# Patient Record
Sex: FEMALE | Race: WHITE | NOT HISPANIC OR LATINO | Employment: FULL TIME | URBAN - METROPOLITAN AREA
[De-identification: names, ages, dates, MRNs, and addresses within clinical notes are randomized per-mention and may not be internally consistent; named-entity substitution may affect disease eponyms.]

---

## 2020-05-07 ENCOUNTER — HOSPITAL ENCOUNTER (OUTPATIENT)
Dept: RADIOLOGY | Facility: HOSPITAL | Age: 40
Discharge: HOME | End: 2020-05-07
Attending: ORTHOPAEDIC SURGERY
Payer: MEDICAID

## 2020-05-07 DIAGNOSIS — M84.375A STRESS FRACTURE OF LEFT FOOT, INITIAL ENCOUNTER: ICD-10-CM

## 2020-05-07 PROCEDURE — 73630 XR FOOT COMPLETE 3 VIEW LEFT: ICD-10-PCS | Mod: 26,LT,, | Performed by: RADIOLOGY

## 2020-05-07 PROCEDURE — 73630 X-RAY EXAM OF FOOT: CPT | Mod: TC,FY,LT

## 2020-05-07 PROCEDURE — 73630 X-RAY EXAM OF FOOT: CPT | Mod: 26,LT,, | Performed by: RADIOLOGY

## 2020-06-17 ENCOUNTER — HOSPITAL ENCOUNTER (OUTPATIENT)
Dept: RADIOLOGY | Facility: HOSPITAL | Age: 40
Discharge: HOME | End: 2020-06-17
Attending: ORTHOPAEDIC SURGERY
Payer: MEDICAID

## 2020-06-17 DIAGNOSIS — M84.375A STRESS REACTION OF LEFT FOOT, INITIAL ENCOUNTER: ICD-10-CM

## 2020-06-17 PROCEDURE — 73630 XR FOOT COMPLETE 3 VIEW LEFT: ICD-10-PCS | Mod: 26,LT,, | Performed by: RADIOLOGY

## 2020-06-17 PROCEDURE — 73630 X-RAY EXAM OF FOOT: CPT | Mod: 26,LT,, | Performed by: RADIOLOGY

## 2020-06-17 PROCEDURE — 73630 X-RAY EXAM OF FOOT: CPT | Mod: TC,FY,LT

## 2020-09-02 ENCOUNTER — HOSPITAL ENCOUNTER (OUTPATIENT)
Dept: RADIOLOGY | Facility: HOSPITAL | Age: 40
Discharge: HOME | End: 2020-09-02
Attending: ORTHOPAEDIC SURGERY
Payer: MEDICAID

## 2020-09-02 DIAGNOSIS — M79.672 CHRONIC PAIN IN LEFT FOOT: ICD-10-CM

## 2020-09-02 DIAGNOSIS — S92.352A CLOSED DISPLACED FRACTURE OF FIFTH METATARSAL BONE OF LEFT FOOT: ICD-10-CM

## 2020-09-02 DIAGNOSIS — M79.672 CHRONIC PAIN IN LEFT FOOT: Primary | ICD-10-CM

## 2020-09-02 DIAGNOSIS — G89.29 CHRONIC PAIN IN LEFT FOOT: Primary | ICD-10-CM

## 2020-09-02 DIAGNOSIS — G89.29 CHRONIC PAIN IN LEFT FOOT: ICD-10-CM

## 2020-09-02 PROCEDURE — 73630 X-RAY EXAM OF FOOT: CPT | Mod: TC,FY,LT

## 2020-09-02 PROCEDURE — 73630 X-RAY EXAM OF FOOT: CPT | Mod: 26,LT,, | Performed by: RADIOLOGY

## 2020-09-02 PROCEDURE — 73630 XR FOOT COMPLETE 3 VIEW LEFT: ICD-10-PCS | Mod: 26,LT,, | Performed by: RADIOLOGY

## 2021-01-13 ENCOUNTER — TELEPHONE (OUTPATIENT)
Dept: ORTHOPEDICS | Facility: CLINIC | Age: 41
End: 2021-01-13

## 2021-10-19 ENCOUNTER — HOSPITAL ENCOUNTER (EMERGENCY)
Facility: HOSPITAL | Age: 41
Discharge: HOME | End: 2021-10-19
Attending: EMERGENCY MEDICINE
Payer: MEDICAID

## 2021-10-19 VITALS
HEART RATE: 96 BPM | TEMPERATURE: 98 F | BODY MASS INDEX: 43.4 KG/M2 | DIASTOLIC BLOOD PRESSURE: 85 MMHG | SYSTOLIC BLOOD PRESSURE: 127 MMHG | RESPIRATION RATE: 16 BRPM | WEIGHT: 293 LBS | OXYGEN SATURATION: 100 % | HEIGHT: 69 IN

## 2021-10-19 DIAGNOSIS — S90.32XA CONTUSION OF LEFT FOOT, INITIAL ENCOUNTER: Primary | ICD-10-CM

## 2021-10-19 DIAGNOSIS — S92.355G CLOSED NONDISPLACED FRACTURE OF FIFTH METATARSAL BONE OF LEFT FOOT WITH DELAYED HEALING, SUBSEQUENT ENCOUNTER: ICD-10-CM

## 2021-10-19 DIAGNOSIS — R52 PAIN: ICD-10-CM

## 2021-10-19 PROCEDURE — 99283 EMERGENCY DEPT VISIT LOW MDM: CPT | Mod: 25

## 2021-12-01 ENCOUNTER — OFFICE VISIT (OUTPATIENT)
Dept: ORTHOPEDICS | Facility: CLINIC | Age: 41
End: 2021-12-01
Payer: MEDICAID

## 2021-12-01 VITALS
HEIGHT: 69 IN | WEIGHT: 290.81 LBS | SYSTOLIC BLOOD PRESSURE: 162 MMHG | DIASTOLIC BLOOD PRESSURE: 98 MMHG | BODY MASS INDEX: 43.07 KG/M2 | HEART RATE: 84 BPM

## 2021-12-01 DIAGNOSIS — S92.355G CLOSED NONDISPLACED FRACTURE OF FIFTH METATARSAL BONE OF LEFT FOOT WITH DELAYED HEALING, SUBSEQUENT ENCOUNTER: ICD-10-CM

## 2021-12-01 PROCEDURE — 99999 PR PBB SHADOW E&M-EST. PATIENT-LVL III: ICD-10-PCS | Mod: PBBFAC,,, | Performed by: PHYSICIAN ASSISTANT

## 2021-12-01 PROCEDURE — 99213 OFFICE O/P EST LOW 20 MIN: CPT | Mod: PBBFAC,PN | Performed by: PHYSICIAN ASSISTANT

## 2021-12-01 PROCEDURE — 99203 OFFICE O/P NEW LOW 30 MIN: CPT | Mod: S$PBB,,, | Performed by: PHYSICIAN ASSISTANT

## 2021-12-01 PROCEDURE — 99203 PR OFFICE/OUTPT VISIT, NEW, LEVL III, 30-44 MIN: ICD-10-PCS | Mod: S$PBB,,, | Performed by: PHYSICIAN ASSISTANT

## 2021-12-01 PROCEDURE — 99999 PR PBB SHADOW E&M-EST. PATIENT-LVL III: CPT | Mod: PBBFAC,,, | Performed by: PHYSICIAN ASSISTANT

## 2021-12-01 RX ORDER — MELOXICAM 15 MG/1
15 TABLET ORAL DAILY
COMMUNITY
Start: 2021-11-16

## 2021-12-01 RX ORDER — PANTOPRAZOLE SODIUM 40 MG/1
40 TABLET, DELAYED RELEASE ORAL DAILY
COMMUNITY
Start: 2021-11-03

## 2021-12-01 RX ORDER — IBUPROFEN 800 MG/1
800 TABLET ORAL 2 TIMES DAILY
COMMUNITY
Start: 2021-06-14 | End: 2021-12-01

## 2021-12-01 RX ORDER — TRAZODONE HYDROCHLORIDE 50 MG/1
50 TABLET ORAL NIGHTLY
COMMUNITY
Start: 2021-11-26

## 2021-12-01 RX ORDER — BUPROPION HYDROCHLORIDE 300 MG/1
300 TABLET ORAL DAILY
COMMUNITY
Start: 2021-11-29 | End: 2022-02-02

## 2021-12-01 RX ORDER — DICLOFENAC SODIUM 50 MG/1
50 TABLET, DELAYED RELEASE ORAL
COMMUNITY
End: 2021-12-01

## 2021-12-01 RX ORDER — CELECOXIB 100 MG/1
CAPSULE ORAL
COMMUNITY
Start: 2021-06-29 | End: 2021-12-01

## 2021-12-01 RX ORDER — PAROXETINE HYDROCHLORIDE 20 MG/1
20 TABLET, FILM COATED ORAL DAILY
COMMUNITY
Start: 2021-11-16

## 2021-12-06 ENCOUNTER — OFFICE VISIT (OUTPATIENT)
Dept: ORTHOPEDICS | Facility: CLINIC | Age: 41
End: 2021-12-06
Payer: MEDICAID

## 2021-12-06 VITALS
HEART RATE: 105 BPM | SYSTOLIC BLOOD PRESSURE: 133 MMHG | HEIGHT: 69 IN | WEIGHT: 290 LBS | BODY MASS INDEX: 42.95 KG/M2 | DIASTOLIC BLOOD PRESSURE: 89 MMHG

## 2021-12-06 DIAGNOSIS — S92.352K: Primary | ICD-10-CM

## 2021-12-06 PROCEDURE — 99213 OFFICE O/P EST LOW 20 MIN: CPT | Mod: S$PBB,,, | Performed by: ORTHOPAEDIC SURGERY

## 2021-12-06 PROCEDURE — 99999 PR PBB SHADOW E&M-EST. PATIENT-LVL III: CPT | Mod: PBBFAC,,, | Performed by: ORTHOPAEDIC SURGERY

## 2021-12-06 PROCEDURE — 99213 PR OFFICE/OUTPT VISIT, EST, LEVL III, 20-29 MIN: ICD-10-PCS | Mod: S$PBB,,, | Performed by: ORTHOPAEDIC SURGERY

## 2021-12-06 PROCEDURE — 99213 OFFICE O/P EST LOW 20 MIN: CPT | Mod: PBBFAC,PN | Performed by: ORTHOPAEDIC SURGERY

## 2021-12-06 PROCEDURE — 99999 PR PBB SHADOW E&M-EST. PATIENT-LVL III: ICD-10-PCS | Mod: PBBFAC,,, | Performed by: ORTHOPAEDIC SURGERY

## 2021-12-08 ENCOUNTER — TELEPHONE (OUTPATIENT)
Dept: PODIATRY | Facility: CLINIC | Age: 41
End: 2021-12-08
Payer: MEDICAID

## 2021-12-15 ENCOUNTER — OFFICE VISIT (OUTPATIENT)
Dept: PODIATRY | Facility: CLINIC | Age: 41
End: 2021-12-15
Payer: MEDICAID

## 2021-12-15 VITALS — WEIGHT: 289.88 LBS | HEIGHT: 69 IN | BODY MASS INDEX: 42.94 KG/M2

## 2021-12-15 DIAGNOSIS — S92.352K: Primary | ICD-10-CM

## 2021-12-15 DIAGNOSIS — B35.1 ONYCHOMYCOSIS DUE TO DERMATOPHYTE: ICD-10-CM

## 2021-12-15 DIAGNOSIS — M25.676 IMMOBILITY OF JOINT OF ANKLE AND FOOT: ICD-10-CM

## 2021-12-15 DIAGNOSIS — Z01.818 PREOP TESTING: ICD-10-CM

## 2021-12-15 DIAGNOSIS — Z72.0 TOBACCO ABUSE: ICD-10-CM

## 2021-12-15 DIAGNOSIS — E11.9 TYPE 2 DIABETES MELLITUS WITHOUT COMPLICATION, WITHOUT LONG-TERM CURRENT USE OF INSULIN: ICD-10-CM

## 2021-12-15 DIAGNOSIS — M79.672 PAIN OF LEFT FOOT: ICD-10-CM

## 2021-12-15 DIAGNOSIS — M25.673 IMMOBILITY OF JOINT OF ANKLE AND FOOT: ICD-10-CM

## 2021-12-15 DIAGNOSIS — E66.01 MORBID OBESITY: ICD-10-CM

## 2021-12-15 PROCEDURE — 99999 PR PBB SHADOW E&M-EST. PATIENT-LVL IV: CPT | Mod: PBBFAC,,, | Performed by: PODIATRIST

## 2021-12-15 PROCEDURE — 99999 PR PBB SHADOW E&M-EST. PATIENT-LVL IV: ICD-10-PCS | Mod: PBBFAC,,, | Performed by: PODIATRIST

## 2021-12-15 PROCEDURE — 99204 PR OFFICE/OUTPT VISIT, NEW, LEVL IV, 45-59 MIN: ICD-10-PCS | Mod: S$PBB,,, | Performed by: PODIATRIST

## 2021-12-15 PROCEDURE — 99214 OFFICE O/P EST MOD 30 MIN: CPT | Mod: PBBFAC | Performed by: PODIATRIST

## 2021-12-15 PROCEDURE — 99204 OFFICE O/P NEW MOD 45 MIN: CPT | Mod: S$PBB,,, | Performed by: PODIATRIST

## 2021-12-15 RX ORDER — TERBINAFINE HYDROCHLORIDE 250 MG/1
250 TABLET ORAL DAILY
Qty: 90 TABLET | Refills: 0 | Status: SHIPPED | OUTPATIENT
Start: 2021-12-15 | End: 2022-03-22

## 2021-12-27 ENCOUNTER — HOSPITAL ENCOUNTER (OUTPATIENT)
Dept: RADIOLOGY | Facility: HOSPITAL | Age: 41
Discharge: HOME | End: 2021-12-27
Attending: PODIATRIST
Payer: MEDICAID

## 2021-12-27 ENCOUNTER — HOSPITAL ENCOUNTER (OUTPATIENT)
Dept: CARDIOLOGY | Facility: HOSPITAL | Age: 41
Discharge: HOME | End: 2021-12-27
Attending: PODIATRIST
Payer: MEDICAID

## 2021-12-27 DIAGNOSIS — Z01.818 PREOP TESTING: ICD-10-CM

## 2021-12-27 PROCEDURE — 71046 XR CHEST PA AND LATERAL: ICD-10-PCS | Mod: 26,,, | Performed by: RADIOLOGY

## 2021-12-27 PROCEDURE — 93010 EKG 12-LEAD: ICD-10-PCS | Mod: ,,, | Performed by: INTERNAL MEDICINE

## 2021-12-27 PROCEDURE — 71046 X-RAY EXAM CHEST 2 VIEWS: CPT | Mod: 26,,, | Performed by: RADIOLOGY

## 2021-12-27 PROCEDURE — 93010 ELECTROCARDIOGRAM REPORT: CPT | Mod: ,,, | Performed by: INTERNAL MEDICINE

## 2021-12-27 PROCEDURE — 93005 ELECTROCARDIOGRAM TRACING: CPT

## 2021-12-27 PROCEDURE — 71046 X-RAY EXAM CHEST 2 VIEWS: CPT | Mod: TC

## 2022-01-10 ENCOUNTER — PATIENT MESSAGE (OUTPATIENT)
Dept: SURGERY | Facility: HOSPITAL | Age: 42
End: 2022-01-10
Payer: MEDICAID

## 2022-01-11 ENCOUNTER — HOSPITAL ENCOUNTER (OUTPATIENT)
Dept: CARDIOLOGY | Facility: HOSPITAL | Age: 42
Discharge: HOME | End: 2022-01-11
Attending: PODIATRIST
Payer: MEDICAID

## 2022-01-11 ENCOUNTER — PATIENT MESSAGE (OUTPATIENT)
Dept: ADMINISTRATIVE | Facility: OTHER | Age: 42
End: 2022-01-11
Payer: MEDICAID

## 2022-01-11 ENCOUNTER — HOSPITAL ENCOUNTER (OUTPATIENT)
Dept: CARDIOLOGY | Facility: HOSPITAL | Age: 42
Discharge: HOME | End: 2022-01-11
Payer: MEDICAID

## 2022-01-11 VITALS — WEIGHT: 289 LBS | BODY MASS INDEX: 42.8 KG/M2 | HEIGHT: 69 IN

## 2022-01-11 DIAGNOSIS — Z72.0 TOBACCO ABUSE: ICD-10-CM

## 2022-01-11 LAB
LEFT ABI: 1.28
LEFT ARM BP: 136 MMHG
LEFT DORSALIS PEDIS: 157 MMHG
LEFT POSTERIOR TIBIAL: 179 MMHG
LEFT TBI: 0.86
LEFT TOE PRESSURE: 121 MMHG
RIGHT ABI: 1.29
RIGHT ARM BP: 140 MMHG
RIGHT DORSALIS PEDIS: 140 MMHG
RIGHT POSTERIOR TIBIAL: 181 MMHG
RIGHT TBI: 0.96
RIGHT TOE PRESSURE: 134 MMHG

## 2022-01-11 PROCEDURE — 93922 ANKLE BRACHIAL INDICES (ABI): ICD-10-PCS | Mod: 26,,, | Performed by: INTERNAL MEDICINE

## 2022-01-11 PROCEDURE — 93925 LOWER EXTREMITY STUDY: CPT | Mod: 26,,, | Performed by: INTERNAL MEDICINE

## 2022-01-11 PROCEDURE — 93925 CV US DOPPLER ARTERIAL LEGS BILATERAL (CUPID ONLY): ICD-10-PCS | Mod: 26,,, | Performed by: INTERNAL MEDICINE

## 2022-01-11 PROCEDURE — 93925 LOWER EXTREMITY STUDY: CPT

## 2022-01-11 PROCEDURE — 93922 UPR/L XTREMITY ART 2 LEVELS: CPT | Mod: 26,,, | Performed by: INTERNAL MEDICINE

## 2022-01-11 PROCEDURE — 93922 UPR/L XTREMITY ART 2 LEVELS: CPT

## 2022-01-11 NOTE — PATIENT INSTRUCTIONS
Weightbearing Status and Wound care:  1. Do not intentionally place weight upon or walk on the operative extremity.  2. When ambulating, use either crutches, a Knee-Scooter (Roll-A-Bout), rolling walker, standard walker or wheelchair.  3. During daytime/awake hours, if a CAST or POSTERIOR SPLINT is in place, ice the posterior aspect of the leg, behind the knee, 20 minutes on (w/ ice) and followed by 20 minutes off (w/o ice) until follow up; repeat accordingly until follow up. IF no CAST or POSTERIOR SPLINT is in place, ice the anterior aspect of the ankle, in a similar manner. During night time/sleep hours, simply elevating the limb above the level of the heart is sufficient.   4. Elevate the extremity above the level of the heart at all times when sitting/sleeping.  5. Take the pain mediations as prescribed for the initial 3 or so days, then only as needed.  6. Start Xarelto 10mg QD on tomorrow evening for DVT PPx.  7. For the initial 3-4 days, when pains are at their worst, taking Motrin/Advil/Ibuprofen or Aleve can help with pain relief, in addition to the prescribed opioid/narcotic medication.   8. Do not change the dressings.  9. Do not get the dressings wet.    10. Please be reminded of the harmful effects of nicotine/tobacco/cigarette/cigar/marijuana smoking, especially in relation to the lower extremity, particularly in reference to post operative healing. I do rec. complete or near complete cessation of any or all of the aforementioned until you are well out of the post-operative period.

## 2022-01-12 LAB
LEFT ANT TIBIAL SYS PSV: 59 CM/S
LEFT CFA PSV: 105 CM/S
LEFT PERONEAL SYS PSV: 66 CM/S
LEFT POPLITEAL PSV: 62 CM/S
LEFT POST TIBIAL SYS PSV: 129 CM/S
LEFT PROFUNDA SYS PSV: 68 CM/S
LEFT SUPER FEMORAL DIST SYS PSV: 118 CM/S
LEFT SUPER FEMORAL MID SYS PSV: 114 CM/S
LEFT SUPER FEMORAL OSTIAL SYS PSV: 94 CM/S
LEFT SUPER FEMORAL PROX SYS PSV: 123 CM/S
LEFT TIB/PER TRUNK SYS PSV: 68 CM/S
OHS CV LEFT LOWER EXTREMITY ABI (NO CALC): 1.28
OHS CV RIGHT ABI LOWER EXTREMITY (NO CALC): 1.29
RIGHT ANT TIBIAL SYS PSV: 67 CM/S
RIGHT CFA PSV: 104 CM/S
RIGHT PERONEAL SYS PSV: 48 CM/S
RIGHT POPLITEAL PSV: 65 CM/S
RIGHT POST TIBIAL SYS PSV: 128 CM/S
RIGHT PROFUNDA SYS PSV: 134 CM/S
RIGHT SUPER FEMORAL DIST SYS PSV: 109 CM/S
RIGHT SUPER FEMORAL MID SYS PSV: 107 CM/S
RIGHT SUPER FEMORAL OSTIAL SYS PSV: 117 CM/S
RIGHT SUPER FEMORAL PROX SYS PSV: 115 CM/S
RIGHT TIB/PER TRUNK SYS PSV: 47 CM/S

## 2022-01-14 NOTE — PRE ADMISSION SCREENING
Pre op instructions reviewed with pt per phone: Spoke about pre op process and surgery instructions, verbalized understanding.    To confirm, Your surgeon has instructed you:  Surgery is scheduled on 1/19/22      Please report to the main hospital (1st Floor) at [facility] off of [facility] (2nd building on the left, in front of the flag pole).  Please arrive at 0815am. We will call you the afternoon prior to surgery to confirm arrival time, as it is subject to change due to cancellations & emergencies.         INSTRUCTIONS IMPORTANT!!!  Do Not Eat, Drink, or Smoke after 12 midnight! NO WATER after midnight! OK to brush teeth, no gum, candy or mints!      *Take only these medicines with a small swallow of water-morning of surgery.  Paxil, protonix      ____  Do Not wear makeup, mascara or nail polish.   ____  NO Acrylic/fake nails worn day of surgery (hand/arm surgeries)  ____  NO powder, lotions, deodorants or creams to surgical area.  ____  Please remove all jewelry, including piercings and leave at home.  ____  Dentures, Hearing Aids and Contact Lens will need to be removed prior to the start of surgery.  ____  Please bring photo ID and insurance information to hospital (Leave Valuables at Home)  ____  If going home the same day, arrange for a ride home. You will not be able to             drive if Anesthesia was used.   ____  Wear clean, loose fitting clothing. Allow for dressings, bandages.  ____  Stop Aspirin, Ibuprofen, Motrin and Aleve at least 5-7 days before surgery, unless otherwise instructed by your doctor, or the nurse. You MAY use Tylenol/acetaminophen until day of                surgery.  ____  If you take diabetic medication, do NOT take morning of surgery unless instructed by             Doctor. Metformin to be stopped 24 hrs prior to surgery time. DO NOT take long-acting insulin the evening before surgery. Blood sugars will be checked in pre-op morning of.  ____ Stop taking any Fish Oil supplements  or Vitamins at least 5 days prior to surgery, unless instructed otherwise by your Doctor.          Bathing Instructions: The night before surgery and the morning prior to coming to the hospital:   -Do not shave your face.  -Do not shave pubic hair 7 days prior to surgery (gyn pt's).  -Do not shave legs/underarms 3 days prior to surgery.   -Shower & Rinse your body as usual with anti-bacterial Soap (Dial, Lever 2000, or Hibiclens)   -Do not use hibiclens on your head, face, or genitals.   -Do not wash with anti-bacterial soap after you use the hibiclens.   -Rinse your body thoroughly.      [facility] Visitor/Ride Policy:  Only 1 adult allowed (over the age of 18) to accompany you into Pre-op/Recovery Surgery Dept. All other visitors will need to wait in waiting room. Must have a ride home from a responsible adult that you know and trust. Medical Transport, Uber or Lyft can only be used if patient has a responsible adult to accompany them during ride home.    Post-Op Instructions: You will receive surgery post-op instructions by your Discharge Nurse prior to going home.    Surgical Site Infection:    Prevention of surgical site infections:     -Keep incisions clean and dry.   -Do not soak/submerge incisions in water until completely healed.   -Do not apply lotions, powders, creams, or deodorants to site.   -Always make sure hands are cleaned with antibacterial soap/ alcohol-based   prior to touching the surgical site.      Signs and symptoms:   -Redness and pain around the area where you had surgery   -Drainage of cloudy fluid from your surgical wound   -Fever over 100.4 or chills  >>>Call Surgeon office/on-call Surgeon if you experience any of these signs & symptoms post-surgery.      *Please Call [facility] Pre-Admissions Department with surgery instruction questions at [phone] or [phone].    *Insurance Questions, please call [phone].

## 2022-01-18 ENCOUNTER — TELEPHONE (OUTPATIENT)
Dept: PREADMISSION TESTING | Facility: HOSPITAL | Age: 42
End: 2022-01-18
Payer: MEDICAID

## 2022-01-18 NOTE — TELEPHONE ENCOUNTER
ASHISH Casas Staff  Cc: Katalina Enrique RN  Caller: Unspecified (Today,  7:36 AM)  Good morning, does this pt have her PCP H&P?

## 2022-01-19 ENCOUNTER — HOSPITAL ENCOUNTER (OUTPATIENT)
Facility: HOSPITAL | Age: 42
Discharge: HOME | End: 2022-01-19
Attending: PODIATRIST | Admitting: PODIATRIST
Payer: MEDICAID

## 2022-01-19 ENCOUNTER — ANESTHESIA (OUTPATIENT)
Dept: SURGERY | Facility: HOSPITAL | Age: 42
End: 2022-01-19
Payer: MEDICAID

## 2022-01-19 ENCOUNTER — ANESTHESIA EVENT (OUTPATIENT)
Dept: SURGERY | Facility: HOSPITAL | Age: 42
End: 2022-01-19
Payer: MEDICAID

## 2022-01-19 DIAGNOSIS — S92.902K NONUNION OF FOOT FRACTURE, LEFT: ICD-10-CM

## 2022-01-19 LAB
B-HCG UR QL: NEGATIVE
CTP QC/QA: YES
POCT GLUCOSE: 113 MG/DL (ref 70–110)
POCT GLUCOSE: 137 MG/DL (ref 70–110)

## 2022-01-19 PROCEDURE — 36000709 HC OR TIME LEV III EA ADD 15 MIN: Performed by: PODIATRIST

## 2022-01-19 PROCEDURE — 36000708 HC OR TIME LEV III 1ST 15 MIN: Performed by: PODIATRIST

## 2022-01-19 PROCEDURE — C1769 GUIDE WIRE: HCPCS | Performed by: PODIATRIST

## 2022-01-19 PROCEDURE — 01480 ANES OPEN PX LOWER L/A/F NOS: CPT | Performed by: PODIATRIST

## 2022-01-19 PROCEDURE — 82962 GLUCOSE BLOOD TEST: CPT | Performed by: PODIATRIST

## 2022-01-19 PROCEDURE — 25000003 PHARM REV CODE 250: Performed by: PODIATRIST

## 2022-01-19 PROCEDURE — C1713 ANCHOR/SCREW BN/BN,TIS/BN: HCPCS | Performed by: PODIATRIST

## 2022-01-19 PROCEDURE — 37000008 HC ANESTHESIA 1ST 15 MINUTES: Performed by: PODIATRIST

## 2022-01-19 PROCEDURE — 37000009 HC ANESTHESIA EA ADD 15 MINS: Performed by: PODIATRIST

## 2022-01-19 PROCEDURE — 25000003 PHARM REV CODE 250: Performed by: NURSE ANESTHETIST, CERTIFIED REGISTERED

## 2022-01-19 PROCEDURE — 71000033 HC RECOVERY, INTIAL HOUR: Performed by: PODIATRIST

## 2022-01-19 PROCEDURE — 28322 PR REPAIR NON/MALUNION METATARSAL: ICD-10-PCS | Mod: T4,,, | Performed by: PODIATRIST

## 2022-01-19 PROCEDURE — 63600175 PHARM REV CODE 636 W HCPCS: Performed by: NURSE ANESTHETIST, CERTIFIED REGISTERED

## 2022-01-19 PROCEDURE — 27201423 OPTIME MED/SURG SUP & DEVICES STERILE SUPPLY: Performed by: PODIATRIST

## 2022-01-19 PROCEDURE — 27800903 OPTIME MED/SURG SUP & DEVICES OTHER IMPLANTS: Performed by: PODIATRIST

## 2022-01-19 PROCEDURE — 71000015 HC POSTOP RECOV 1ST HR: Performed by: PODIATRIST

## 2022-01-19 PROCEDURE — 28322 REPAIR OF METATARSALS: CPT | Mod: T4,,, | Performed by: PODIATRIST

## 2022-01-19 PROCEDURE — 81025 URINE PREGNANCY TEST: CPT | Performed by: PODIATRIST

## 2022-01-19 PROCEDURE — 63600175 PHARM REV CODE 636 W HCPCS: Performed by: ANESTHESIOLOGY

## 2022-01-19 DEVICE — SCREW BG LOCKING 2.5X12MM: Type: IMPLANTABLE DEVICE | Site: FOOT | Status: FUNCTIONAL

## 2022-01-19 DEVICE — SCREW BG LOCKING 2.5X10MM: Type: IMPLANTABLE DEVICE | Site: FOOT | Status: FUNCTIONAL

## 2022-01-19 DEVICE — IMPLANTABLE DEVICE: Type: IMPLANTABLE DEVICE | Site: FOOT | Status: FUNCTIONAL

## 2022-01-19 DEVICE — PUTTY DBX 5CC: Type: IMPLANTABLE DEVICE | Site: FOOT | Status: FUNCTIONAL

## 2022-01-19 RX ORDER — MIDAZOLAM HYDROCHLORIDE 1 MG/ML
INJECTION, SOLUTION INTRAMUSCULAR; INTRAVENOUS
Status: DISCONTINUED | OUTPATIENT
Start: 2022-01-19 | End: 2022-01-19

## 2022-01-19 RX ORDER — ACETAMINOPHEN 10 MG/ML
1000 INJECTION, SOLUTION INTRAVENOUS ONCE
Status: DISCONTINUED | OUTPATIENT
Start: 2022-01-19 | End: 2022-01-19 | Stop reason: HOSPADM

## 2022-01-19 RX ORDER — OXYCODONE AND ACETAMINOPHEN 10; 325 MG/1; MG/1
1 TABLET ORAL EVERY 6 HOURS PRN
Qty: 30 TABLET | Refills: 0 | Status: SHIPPED | OUTPATIENT
Start: 2022-01-19 | End: 2022-01-26

## 2022-01-19 RX ORDER — LIDOCAINE HCL/EPINEPHRINE/PF 2%-1:200K
VIAL (ML) INJECTION
Status: DISCONTINUED | OUTPATIENT
Start: 2022-01-19 | End: 2022-01-19 | Stop reason: HOSPADM

## 2022-01-19 RX ORDER — HYDROMORPHONE HYDROCHLORIDE 2 MG/ML
0.2 INJECTION, SOLUTION INTRAMUSCULAR; INTRAVENOUS; SUBCUTANEOUS EVERY 5 MIN PRN
Status: DISCONTINUED | OUTPATIENT
Start: 2022-01-19 | End: 2022-01-19 | Stop reason: HOSPADM

## 2022-01-19 RX ORDER — PROPOFOL 10 MG/ML
VIAL (ML) INTRAVENOUS CONTINUOUS PRN
Status: DISCONTINUED | OUTPATIENT
Start: 2022-01-19 | End: 2022-01-19

## 2022-01-19 RX ORDER — METHOCARBAMOL 750 MG/1
1500 TABLET, FILM COATED ORAL 3 TIMES DAILY
Qty: 60 TABLET | Refills: 0 | Status: SHIPPED | OUTPATIENT
Start: 2022-01-19 | End: 2022-01-29

## 2022-01-19 RX ORDER — BUPIVACAINE HYDROCHLORIDE AND EPINEPHRINE 2.5; 5 MG/ML; UG/ML
INJECTION, SOLUTION EPIDURAL; INFILTRATION; INTRACAUDAL; PERINEURAL
Status: DISCONTINUED | OUTPATIENT
Start: 2022-01-19 | End: 2022-01-19 | Stop reason: HOSPADM

## 2022-01-19 RX ORDER — MEPERIDINE HYDROCHLORIDE 25 MG/ML
12.5 INJECTION INTRAMUSCULAR; INTRAVENOUS; SUBCUTANEOUS ONCE
Status: DISCONTINUED | OUTPATIENT
Start: 2022-01-19 | End: 2022-01-19 | Stop reason: HOSPADM

## 2022-01-19 RX ORDER — CEFADROXIL 500 MG/1
500 CAPSULE ORAL EVERY 12 HOURS
Qty: 10 CAPSULE | Refills: 0 | Status: SHIPPED | OUTPATIENT
Start: 2022-01-19 | End: 2022-01-24

## 2022-01-19 RX ORDER — ONDANSETRON 2 MG/ML
4 INJECTION INTRAMUSCULAR; INTRAVENOUS ONCE AS NEEDED
Status: DISCONTINUED | OUTPATIENT
Start: 2022-01-19 | End: 2022-01-19 | Stop reason: HOSPADM

## 2022-01-19 RX ORDER — GABAPENTIN 300 MG/1
300 CAPSULE ORAL 2 TIMES DAILY
Qty: 60 CAPSULE | Refills: 0 | Status: SHIPPED | OUTPATIENT
Start: 2022-01-19 | End: 2022-02-18

## 2022-01-19 RX ORDER — LIDOCAINE HYDROCHLORIDE 10 MG/ML
INJECTION, SOLUTION EPIDURAL; INFILTRATION; INTRACAUDAL; PERINEURAL
Status: DISCONTINUED | OUTPATIENT
Start: 2022-01-19 | End: 2022-01-19

## 2022-01-19 RX ORDER — FENTANYL CITRATE 50 UG/ML
INJECTION, SOLUTION INTRAMUSCULAR; INTRAVENOUS
Status: DISCONTINUED | OUTPATIENT
Start: 2022-01-19 | End: 2022-01-19

## 2022-01-19 RX ORDER — CLINDAMYCIN PHOSPHATE 900 MG/50ML
900 INJECTION, SOLUTION INTRAVENOUS
Status: COMPLETED | OUTPATIENT
Start: 2022-01-19 | End: 2022-01-19

## 2022-01-19 RX ADMIN — PROPOFOL 100 MCG/KG/MIN: 10 INJECTION, EMULSION INTRAVENOUS at 08:01

## 2022-01-19 RX ADMIN — LIDOCAINE HYDROCHLORIDE 50 MG: 10 INJECTION, SOLUTION EPIDURAL; INFILTRATION; INTRACAUDAL; PERINEURAL at 08:01

## 2022-01-19 RX ADMIN — HYDROMORPHONE HYDROCHLORIDE 0.2 MG: 2 INJECTION INTRAMUSCULAR; INTRAVENOUS; SUBCUTANEOUS at 10:01

## 2022-01-19 RX ADMIN — HYDROMORPHONE HYDROCHLORIDE 0.2 MG: 2 INJECTION INTRAMUSCULAR; INTRAVENOUS; SUBCUTANEOUS at 11:01

## 2022-01-19 RX ADMIN — FENTANYL CITRATE 100 MCG: 50 INJECTION, SOLUTION INTRAMUSCULAR; INTRAVENOUS at 08:01

## 2022-01-19 RX ADMIN — MIDAZOLAM 2 MG: 1 INJECTION INTRAMUSCULAR; INTRAVENOUS at 08:01

## 2022-01-19 RX ADMIN — CLINDAMYCIN PHOSPHATE 900 MG: 900 INJECTION, SOLUTION INTRAVENOUS at 08:01

## 2022-01-19 RX ADMIN — SODIUM CHLORIDE, SODIUM LACTATE, POTASSIUM CHLORIDE, AND CALCIUM CHLORIDE: .6; .31; .03; .02 INJECTION, SOLUTION INTRAVENOUS at 08:01

## 2022-01-19 NOTE — ANESTHESIA POSTPROCEDURE EVALUATION
Anesthesia Post Evaluation    Patient: Mara Mosley    Procedure(s) Performed: Procedure(s) (LRB):  ORIF, FRACTURE, METATARSAL BONE (Left)  HARVEST OF BONE GRAFT (Left)    Final Anesthesia Type: MAC      Patient location during evaluation: PACU  Patient participation: Yes- Able to Participate  Level of consciousness: awake and alert  Post-procedure vital signs: reviewed and stable  Pain management: adequate  Airway patency: patent  WILLA mitigation strategies: Verification of full reversal of neuromuscular block  PONV status at discharge: No PONV  Anesthetic complications: no      Cardiovascular status: hemodynamically stable  Respiratory status: spontaneous ventilation  Hydration status: euvolemic  Follow-up not needed.          Vitals Value Taken Time   /75 01/19/22 1130   Temp 36.3 °C (97.4 °F) 01/19/22 1045   Pulse 70 01/19/22 1130   Resp 18 01/19/22 1130   SpO2 98 % 01/19/22 1130         Event Time   Out of Recovery 11:30:29         Pain/Presley Score: Pain Rating Prior to Med Admin: 4 (1/19/2022 11:00 AM)  Presley Score: 10 (1/19/2022 11:30 AM)

## 2022-01-19 NOTE — DISCHARGE SUMMARY
[facility] - Surgery (Hospital)  Discharge Note  Short Stay    Procedure(s) (LRB):  ORIF, FRACTURE, METATARSAL BONE (Left)  HARVEST OF BONE GRAFT (Left)    OUTCOME: Patient tolerated treatment/procedure well without complication and is now ready for discharge.    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 Do not intentionally place weight upon or walk on the operative extremity.  2. When ambulating, use either crutches, a Knee-Scooter (Roll-A-Bout), rolling walker, standard walker or wheelchair.  3. During daytime/awake hours, if a CAST or POSTERIOR SPLINT is in place, ice the posterior aspect of the leg, behind the knee, 20 minutes on (w/ ice) and followed by 20 minutes off (w/o ice) until follow up; repeat accordingly until follow up. IF no CAST or POSTERIOR SPLINT is in place, ice the anterior aspect of the ankle, in a similar manner. During night time/sleep hours, simply elevating the limb above the level of the heart is sufficient.   4. Elevate the extremity above the level of the heart at all times when sitting/sleeping.  5. Take the pain mediations as prescribed for the initial 3 or so days, then only as needed.  6. Start Xarelto 10mg QD on tomorrow evening for DVT PPx.  7. For the initial 3-4 days, when pains are at their worst, taking Motrin/Advil/Ibuprofen or Aleve can help with pain relief, in addition to the prescribed opioid/narcotic medication.   8. Do not change the dressings.  9. Do not get the dressings wet.    10. Please be reminded of the harmful effects of nicotine/tobacco/cigarette/cigar/marijuana smoking, especially in relation to the lower extremity, particularly in reference to post operative healing. I do rec. complete or near complete  cessation of any or all of the aforementioned until you are well out of the post-operative period.       Clinical Reference Documents Added to Patient Instructions       Document    CEFADROXIL, ADULT (ENGLISH)    GABAPENTIN, ADULT (ENGLISH)    METHOCARBAMOL, ADULT (ENGLISH)    OXYCODONE AND ACETAMINOPHEN, ADULT (ENGLISH)    RIVAROXABAN, ADULT (ENGLISH)          TIME SPENT ON DISCHARGE: 10 minutes

## 2022-01-19 NOTE — ANESTHESIA RELEASE NOTE
"Anesthesia Release from PACU Note    Patient: Mara Mosley    Procedure(s) Performed: Procedure(s) (LRB):  ORIF, FRACTURE, METATARSAL BONE (Left)  HARVEST OF BONE GRAFT (Left)    Anesthesia type: MAC    Post pain: Adequate analgesia    Post assessment: no apparent anesthetic complications    Last Vitals:   Visit Vitals  BP (!) 149/89   Pulse 82   Temp 36.6 °C (97.9 °F) (Temporal)   Resp 18   Ht 5' 9" (1.753 m)   Wt 131 kg (288 lb 11.1 oz)   SpO2 98%   Breastfeeding No   BMI 42.63 kg/m²       Post vital signs: stable    Level of consciousness: awake    Nausea/Vomiting: no nausea/no vomiting    Complications: none    Airway Patency: patent    Respiratory: unassisted    Cardiovascular: stable and blood pressure at baseline    Hydration: euvolemic  "

## 2022-01-19 NOTE — ANESTHESIA PREPROCEDURE EVALUATION
01/19/2022  Mara Mosley is a 41 y.o., female.    Anesthesia Evaluation    I have reviewed the Patient Summary Reports.    I have reviewed the Nursing Notes. I have reviewed the NPO Status.   I have reviewed the Medications.     Review of Systems  Anesthesia Hx:  No problems with previous Anesthesia    Social:  Smoker    Hematology/Oncology:  Hematology Normal        Cardiovascular:  Cardiovascular Normal     Pulmonary:  Pulmonary Normal    Renal/:  Renal/ Normal     Hepatic/GI:  Hepatic/GI Normal    Musculoskeletal:   Arthritis     Neurological:  Neurology Normal    Endocrine:   Diabetes, well controlled, type 2        Physical Exam  General:  Well nourished, Obesity    Airway/Jaw/Neck:  Airway Findings: Mouth Opening: Normal Tongue: Normal  General Airway Assessment: Adult  Mallampati: II  TM Distance: 4 - 6 cm  Jaw/Neck Findings:  Neck ROM: Normal ROM      Dental:  Dental Findings: In tact   Chest/Lungs:  Chest/Lungs Findings: Clear to auscultation, Normal Respiratory Rate     Heart/Vascular:  Heart Findings: Rate: Normal  Rhythm: Regular Rhythm  Sounds: Normal        Mental Status:  Mental Status Findings:  Cooperative, Alert and Oriented         Anesthesia Plan  Type of Anesthesia, risks & benefits discussed:  Anesthesia Type:  MAC    Patient's Preference:   Plan Factors:          Intra-op Monitoring Plan: standard ASA monitors  Intra-op Monitoring Plan Comments:   Post Op Pain Control Plan: multimodal analgesia and per primary service following discharge from PACU  Post Op Pain Control Plan Comments:     Induction:   IV  Beta Blocker:         Informed Consent: Patient understands risks and agrees with Anesthesia plan.  Questions answered. Anesthesia consent signed with patient.  ASA Score: 3     Day of Surgery Review of History & Physical:  There are no significant changes.          Ready For  Surgery From Anesthesia Perspective.        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

## 2022-01-19 NOTE — BRIEF OP NOTE
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    Estimated Blood Loss: * No values recorded between 1/19/2022  9:05 AM and 1/19/2022 10:44 AM *         Specimens:   Specimen (24h ago, onward)            None            Discharge Note    OUTCOME: Patient tolerated treatment/procedure well without complication and is now ready for discharge.    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 Do not intentionally place weight upon or walk on the operative extremity.  2. When ambulating, use either crutches, a Knee-Scooter (Roll-A-Bout), rolling walker, standard walker or wheelchair.  3. During daytime/awake hours, if a CAST or POSTERIOR SPLINT is in place, ice the posterior aspect of the leg, behind the knee, 20 minutes on (w/ ice) and followed by 20 minutes off (w/o ice) until follow up; repeat accordingly until follow up. IF no CAST or POSTERIOR SPLINT is in place, ice the anterior aspect of the ankle, in a similar manner. During night time/sleep hours, simply elevating the limb above the level of the heart is sufficient.   4. Elevate the  extremity above the level of the heart at all times when sitting/sleeping.  5. Take the pain mediations as prescribed for the initial 3 or so days, then only as needed.  6. Start Xarelto 10mg QD on tomorrow evening for DVT PPx.  7. For the initial 3-4 days, when pains are at their worst, taking Motrin/Advil/Ibuprofen or Aleve can help with pain relief, in addition to the prescribed opioid/narcotic medication.   8. Do not change the dressings.  9. Do not get the dressings wet.    10. Please be reminded of the harmful effects of nicotine/tobacco/cigarette/cigar/marijuana smoking, especially in relation to the lower extremity, particularly in reference to post operative healing. I do rec. complete or near complete cessation of any or all of the aforementioned until you are well out of the post-operative period.       Clinical Reference Documents Added to Patient Instructions       Document    CEFADROXIL, ADULT (ENGLISH)    GABAPENTIN, ADULT (ENGLISH)    METHOCARBAMOL, ADULT (ENGLISH)    OXYCODONE AND ACETAMINOPHEN, ADULT (ENGLISH)    RIVAROXABAN, ADULT (ENGLISH)

## 2022-01-19 NOTE — OP NOTE
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 Repair of non-union, LLE 5th metatarsal bone.   2. Autograft harvest, large, left calcaneus.    Description of the Findings of the Procedure: The patient was seen in the Holding Room. The risks, benefits, complications, treatment options, and expected outcomes were discussed with the patient. The risks and potential complications of their problem and purposed treatment include but are not limited to infection, nerve injury, vascular injury, nonunion/malunion/delayed union of the surgical site, persistent pain, potential skin necrosis, deep vein thrombosis, possible pulmonary embolus, complications of the anesthetics and failure of the implant.  The patient concurred with the proposed plan, giving informed consent. The patient is aware that the procedure may be a part of a staged collection of procedures for definitive cure and/or alleviation of symptoms. The site of surgery properly noted/marked. Preoperative intravenous antibiotics are hanging at bedside, and are currently being administered via the heparin lock. The patient was taken to Operating Suite.    Once in the operative suite, the patient is transferred onto the operative table in the supine position. A  TIME-OUT is taken as per protocol to identify the proper patient, procedure to be performed, and laterality.  The patient is properly positioned on the operating room table for ease of dissection and for any ancillary imaging. A well-padded tourniquet was applied to the left mid-thigh. Nursing and ancillary OR staff prepared the patient for the procedure. The patient is adequately sedated by the Attending Anesthesiologist and/or covering CRNA.  Following this, a preoperative regional/local block was given to the proximal lateral heel bone and 5th TMTJ with 30cc of Marcaine with Epin.  Next, the operative limb was rendered sterile using chlorhexidine paint and scrub.  Sterile sheets and drapes were applied thereafter. Another TIME-OUT is taken as per protocol to identify the proper patient, procedure to be performed, and laterality.      The tourniquet is elevated to 340mmHg after the limb is exsanguinated for approximately 2 min with an Esmarch bandage. Following this, sharp skin incision at the dorsal lateral aspect of the 5th metatarsal ray in the area of the fracture progressing to the midfoot/hindfoot junction.  Deep dissection with tenotomy scissor.  Electrocautery as necessitated.  Neurovascular structures are retracted.  The peroneal tendon is slightly debrided from the base of the 5th metatarsal bone.  The fracture site is visualized and is debrided with a dental pick and a small osteotome. The non-union and sclerotic bone is resected with a sagittal saw.  After adequate debridement, copious lavage with normal saline solution was performed.  The fracture site is back filled using DBM putty after subchondral drilling.    At this time, sharp incision with a scalpel blade atop the lateral aspect of the calcaneus. The incision is approximately 1cm in length. The skin incision is taken down to bone with the assistance of a sharp tenotomy scissor. Once down to bone, a rectangular piece of bone is removed from the  superior tuberosity of the calcaneus. The bone measures 9mm x 1.5cm x 9mm.    This bone is placed into the defect at the 5th metatarsal and is augmented with further DBM putty. Fracture site is reduced with a point-to-point bone clamp and plate fixation. After anatomical reduction, plate fixation using a [id number] straight plate.  The tourniquet is deflated and hemostasis achieved. Further augmentation of fracture site with DBM putty.    Deep closure using Vicryl suture.  Skin is closed using Nylon and staples.  Postop anesthetic block is given proximal to the area pathology. All incisions are dressed with Xeroform/Adaptic nonadherent dressings followed by sterile 4 x 4 gauze, abdominal pad, Stephanie/Kerlix and light ACE and Unna Boot w/ a posterior splint.    The anesthesia is weaned. There were no complications to this procedure. Any final necessary imaging to be performed in the PACU if it was not performed here in the OR suite.  The patient is transferred to the [facility] bed/stretcher, \[facility]\"". The patient is transferred to the PACU.    Significant Surgical Tasks Conducted by the Assistant(s), if Applicable: N/A    Complications: * No complications entered in OR log *    Estimated Blood Loss (EBL): Minimal    Drains: N/A    Implants:   Implant Name Type Inv. Item Serial No.  Lot No. LRB No. Used Action   [id number]  PUTTY DBX 5CC [id number] MUSCULOSKELETAL TRANSPLANT FND N/A Left 1 Implanted   KWIRE SMTH TRCR TIP [id number] - SNA  KWIRE SMTH TRCR TIP [id number] NA [employer] NA Left 2 Implanted and Explanted   2.5X14mm Locking Screw   NA [employer] NA Left 1 Implanted   SCREW BG LOCKING 2.5X12MM - SNA`  SCREW BG LOCKING 2.5X12MM NA` [employer] NA Left 1 Implanted   2.5X12mm Non-locking screw   NA  NA Left 1 Implanted   SCREW BG NON LOCKING 2.5X10MM - SNA  SCREW BG NON LOCKING 2.5X10MM NA [employer] NA Left 2 Implanted   SCREW BG LOCKING 2.5X10MM - SNA  SCREW BG LOCKING  2.5X10MM NA [employer] NA Left 1 Implanted   2.5X16MM NON-LOCKING SCREW   NA  NA Left 1 Implanted   PLATE BG HOOK COMPR 5H L 1.2MM - SNA  PLATE BG HOOK COMPR 5H L 1.2MM NA [employer] NA Left 1 Implanted       Specimens: * No specimens in log *    Condition: stable    Disposition: PACU - hemodynamically stable.    Attestation: I performed the procedure.

## 2022-01-19 NOTE — TRANSFER OF CARE
"Anesthesia Transfer of Care Note    Patient: Mara Mosley    Procedure(s) Performed: Procedure(s) (LRB):  ORIF, FRACTURE, METATARSAL BONE (Left)  HARVEST OF BONE GRAFT (Left)    Patient location: PACU    Anesthesia Type: MAC    Transport from OR: Transported from OR on room air with adequate spontaneous ventilation    Post pain: adequate analgesia    Post assessment: no apparent anesthetic complications    Post vital signs: stable    Level of consciousness: awake    Nausea/Vomiting: no nausea/vomiting    Complications: none    Transfer of care protocol was followed      Last vitals:   Visit Vitals  BP (!) 149/89   Pulse 82   Temp 36.6 °C (97.9 °F) (Temporal)   Resp 18   Ht 5' 9" (1.753 m)   Wt 131 kg (288 lb 11.1 oz)   SpO2 98%   Breastfeeding No   BMI 42.63 kg/m²     "

## 2022-01-23 ENCOUNTER — HOSPITAL ENCOUNTER (EMERGENCY)
Facility: HOSPITAL | Age: 42
Discharge: HOME | End: 2022-01-23
Attending: EMERGENCY MEDICINE
Payer: MEDICAID

## 2022-01-23 ENCOUNTER — NURSE TRIAGE (OUTPATIENT)
Dept: ADMINISTRATIVE | Facility: CLINIC | Age: 42
End: 2022-01-23
Payer: MEDICAID

## 2022-01-23 ENCOUNTER — PATIENT MESSAGE (OUTPATIENT)
Dept: SURGERY | Facility: HOSPITAL | Age: 42
End: 2022-01-23
Payer: MEDICAID

## 2022-01-23 VITALS
HEART RATE: 102 BPM | WEIGHT: 283 LBS | BODY MASS INDEX: 41.79 KG/M2 | SYSTOLIC BLOOD PRESSURE: 119 MMHG | RESPIRATION RATE: 20 BRPM | TEMPERATURE: 99 F | OXYGEN SATURATION: 94 % | DIASTOLIC BLOOD PRESSURE: 74 MMHG

## 2022-01-23 DIAGNOSIS — R50.82 POST-PROCEDURAL FEVER: Primary | ICD-10-CM

## 2022-01-23 DIAGNOSIS — Z98.890 HISTORY OF OPEN REDUCTION AND INTERNAL FIXATION (ORIF) PROCEDURE: ICD-10-CM

## 2022-01-23 DIAGNOSIS — G89.18 POST-OP PAIN: ICD-10-CM

## 2022-01-23 DIAGNOSIS — A41.9 SEPSIS: ICD-10-CM

## 2022-01-23 LAB
ALBUMIN SERPL BCP-MCNC: 3.1 G/DL (ref 3.5–5.2)
ALP SERPL-CCNC: 101 U/L (ref 55–135)
ALT SERPL W/O P-5'-P-CCNC: 25 U/L (ref 10–44)
ANION GAP SERPL CALC-SCNC: 12 MMOL/L (ref 8–16)
AST SERPL-CCNC: 23 U/L (ref 10–40)
BASOPHILS # BLD AUTO: 0.05 K/UL (ref 0–0.2)
BASOPHILS NFR BLD: 0.6 % (ref 0–1.9)
BILIRUB SERPL-MCNC: 0.2 MG/DL (ref 0.1–1)
BILIRUB UR QL STRIP: NEGATIVE
BUN SERPL-MCNC: 8 MG/DL (ref 6–20)
CALCIUM SERPL-MCNC: 8.8 MG/DL (ref 8.7–10.5)
CHLORIDE SERPL-SCNC: 100 MMOL/L (ref 95–110)
CLARITY UR: CLEAR
CO2 SERPL-SCNC: 28 MMOL/L (ref 23–29)
COLOR UR: YELLOW
CREAT SERPL-MCNC: 0.9 MG/DL (ref 0.5–1.4)
CRP SERPL-MCNC: 75 MG/L (ref 0–8.2)
CTP QC/QA: YES
CTP QC/QA: YES
DIFFERENTIAL METHOD: NORMAL
EOSINOPHIL # BLD AUTO: 0.3 K/UL (ref 0–0.5)
EOSINOPHIL NFR BLD: 3.6 % (ref 0–8)
ERYTHROCYTE [DISTWIDTH] IN BLOOD BY AUTOMATED COUNT: 14 % (ref 11.5–14.5)
EST. GFR  (AFRICAN AMERICAN): >60 ML/MIN/1.73 M^2
EST. GFR  (NON AFRICAN AMERICAN): >60 ML/MIN/1.73 M^2
GLUCOSE SERPL-MCNC: 99 MG/DL (ref 70–110)
GLUCOSE UR QL STRIP: NEGATIVE
HCT VFR BLD AUTO: 38.6 % (ref 37–48.5)
HGB BLD-MCNC: 12.7 G/DL (ref 12–16)
HGB UR QL STRIP: NEGATIVE
IMM GRANULOCYTES # BLD AUTO: 0.03 K/UL (ref 0–0.04)
IMM GRANULOCYTES NFR BLD AUTO: 0.3 % (ref 0–0.5)
KETONES UR QL STRIP: NEGATIVE
LACTATE SERPL-SCNC: 1.5 MMOL/L (ref 0.5–2.2)
LEUKOCYTE ESTERASE UR QL STRIP: NEGATIVE
LYMPHOCYTES # BLD AUTO: 1.8 K/UL (ref 1–4.8)
LYMPHOCYTES NFR BLD: 21 % (ref 18–48)
MCH RBC QN AUTO: 29.2 PG (ref 27–31)
MCHC RBC AUTO-ENTMCNC: 32.9 G/DL (ref 32–36)
MCV RBC AUTO: 89 FL (ref 82–98)
MONOCYTES # BLD AUTO: 0.6 K/UL (ref 0.3–1)
MONOCYTES NFR BLD: 7 % (ref 4–15)
NEUTROPHILS # BLD AUTO: 5.8 K/UL (ref 1.8–7.7)
NEUTROPHILS NFR BLD: 67.5 % (ref 38–73)
NITRITE UR QL STRIP: NEGATIVE
NRBC BLD-RTO: 0 /100 WBC
PH UR STRIP: 6 [PH] (ref 5–8)
PLATELET # BLD AUTO: 256 K/UL (ref 150–450)
PMV BLD AUTO: 9.8 FL (ref 9.2–12.9)
POC MOLECULAR INFLUENZA A AGN: NEGATIVE
POC MOLECULAR INFLUENZA B AGN: NEGATIVE
POTASSIUM SERPL-SCNC: 4.2 MMOL/L (ref 3.5–5.1)
PROCALCITONIN SERPL IA-MCNC: 0.05 NG/ML
PROT SERPL-MCNC: 6.9 G/DL (ref 6–8.4)
PROT UR QL STRIP: NEGATIVE
RBC # BLD AUTO: 4.35 M/UL (ref 4–5.4)
SARS-COV-2 RDRP RESP QL NAA+PROBE: NEGATIVE
SODIUM SERPL-SCNC: 140 MMOL/L (ref 136–145)
SP GR UR STRIP: >=1.03 (ref 1–1.03)
URN SPEC COLLECT METH UR: ABNORMAL
UROBILINOGEN UR STRIP-ACNC: NEGATIVE EU/DL
WBC # BLD AUTO: 8.62 K/UL (ref 3.9–12.7)

## 2022-01-23 PROCEDURE — 86140 C-REACTIVE PROTEIN: CPT | Performed by: EMERGENCY MEDICINE

## 2022-01-23 PROCEDURE — 93005 ELECTROCARDIOGRAM TRACING: CPT

## 2022-01-23 PROCEDURE — 80053 COMPREHEN METABOLIC PANEL: CPT | Performed by: NURSE PRACTITIONER

## 2022-01-23 PROCEDURE — 99285 EMERGENCY DEPT VISIT HI MDM: CPT | Mod: 25

## 2022-01-23 PROCEDURE — 93010 ELECTROCARDIOGRAM REPORT: CPT | Mod: ,,, | Performed by: STUDENT IN AN ORGANIZED HEALTH CARE EDUCATION/TRAINING PROGRAM

## 2022-01-23 PROCEDURE — U0002 COVID-19 LAB TEST NON-CDC: HCPCS | Performed by: NURSE PRACTITIONER

## 2022-01-23 PROCEDURE — 87040 BLOOD CULTURE FOR BACTERIA: CPT | Mod: 59 | Performed by: NURSE PRACTITIONER

## 2022-01-23 PROCEDURE — 85025 COMPLETE CBC W/AUTO DIFF WBC: CPT | Performed by: NURSE PRACTITIONER

## 2022-01-23 PROCEDURE — 83605 ASSAY OF LACTIC ACID: CPT | Performed by: NURSE PRACTITIONER

## 2022-01-23 PROCEDURE — 84145 PROCALCITONIN (PCT): CPT | Performed by: EMERGENCY MEDICINE

## 2022-01-23 PROCEDURE — 93010 EKG 12-LEAD: ICD-10-PCS | Mod: ,,, | Performed by: STUDENT IN AN ORGANIZED HEALTH CARE EDUCATION/TRAINING PROGRAM

## 2022-01-23 PROCEDURE — 81003 URINALYSIS AUTO W/O SCOPE: CPT | Performed by: NURSE PRACTITIONER

## 2022-01-23 NOTE — TELEPHONE ENCOUNTER
Pt with c/o of fever of 101.0 currently to as high as 102.1 last night.  Pt states she had surgery on her left foot this past Wednesday at [facility].  Pt was instructed to seek care at the ED per the Care advice;  Pt verbally understood instructions and her  was going to drive her to [facility] ED in [place] with in the 4 hour suggested time frame.    Reason for Disposition   Fever > 100.4 F (38.0 C)    Additional Information   Negative: Chest pain   Negative: Difficulty breathing   Negative: Acting confused (e.g., disoriented, slurred speech) or excessively sleepy   Negative: [1] Discomfort (pain, burning or stinging) when passing urine AND [2] female   Negative: [1] Discomfort (pain, burning or stinging) when passing urine AND [2] male   Negative: New or worsening leg (calf, thigh) pain   Negative: New or worsening leg swelling   Negative: Symptoms arising from use of a urinary catheter (Vences or Coude)   Negative: Cast problems or questions   Negative: Medication question   Negative: [1] Widespread rash AND [2] bright red, sunburn-like   Negative: [1] SEVERE headache AND [2] after spinal (epidural) anesthesia   Negative: [1] Vomiting AND [2] persists > 4 hours   Negative: [1] Vomiting AND [2] abdomen looks much more swollen than usual   Negative: [1] Drinking very little AND [2] dehydration suspected (e.g., no urine > 12 hours, very dry mouth, very lightheaded)   Negative: Patient sounds very sick or weak to the triager   Negative: Sounds like a serious complication to the triager    Protocols used: POST-OP SYMPTOMS AND [id number]-VA

## 2022-01-23 NOTE — FIRST PROVIDER EVALUATION
Medical screening exam completed.  I have conducted a focused provider triage encounter, findings are as follows:    Brief history of present illness:  Post op fever    Vitals:    01/23/22 1546   BP: (!) 142/101   BP Location: Right arm   Patient Position: Sitting   Pulse: 107   Resp: 18   Temp: 98.5 °F (36.9 °C)   TempSrc: Oral   SpO2: 96%   Weight: 128.4 kg (283 lb)         Preliminary workup initiated; this workup will be continued and followed by the physician or advanced practice provider that is assigned to the patient when roomed.   no...

## 2022-01-24 VITALS
OXYGEN SATURATION: 98 % | HEIGHT: 69 IN | BODY MASS INDEX: 42.76 KG/M2 | WEIGHT: 288.69 LBS | HEART RATE: 70 BPM | RESPIRATION RATE: 18 BRPM | DIASTOLIC BLOOD PRESSURE: 75 MMHG | SYSTOLIC BLOOD PRESSURE: 149 MMHG | TEMPERATURE: 97 F

## 2022-01-24 NOTE — ED PROVIDER NOTES
SCRIBE #1 NOTE: I, Ashwini Smith, am scribing for, and in the presence of, Carissa Knight MD. I have scribed the entire note.       History     Chief Complaint   Patient presents with    Fever     Fever post surgery on left leg Wednesday      Review of patient's allergies indicates:  No Known Allergies      History of Present Illness     HPI    1/23/2022, 6:32 PM  History obtained from the patient      History of Present Illness: Mara Mosley is a 41 y.o. female patient with a PMHx of DM who presents to the Emergency Department for evaluation of a fever which onset gradually x 3 days pta. Pt reports having a surgery done 4 days ago and sates that sxs started the day after and have been constant. She reports taking percocet for pain but has not taken it today. Pt states she has not had an odor around surgical site and is complaint with Duricef abx. She has a follow up appointment for her foot on 2/2/22. She denies any trauma to the foot. Pt reports having COVID-19 2 weeks prior to the surgery. Symptoms are constant and moderate in severity. No mitigating or exacerbating factors reported. No additional associated sxs reported. Patient denies any chills, N/V, HA, SOB, numbness, and all other sxs at this time. No prior Tx reported. No further complaints or concerns at this time.       Arrival mode: Personal vehicle    PCP: [facility]        Past Medical History:  Past Medical History:   Diagnosis Date    Arthritis     Diabetes mellitus     Fractures        Past Surgical History:  Past Surgical History:   Procedure Laterality Date    ENDOMETRIAL ABLATION      OPEN REDUCTION AND INTERNAL FIXATION (ORIF) OF FRACTURE OF METATARSAL BONE Left 1/19/2022    Procedure: ORIF, FRACTURE, METATARSAL BONE;  Surgeon: Colton Esteves DPM;  Location: [facility];  Service: Podiatry;  Laterality: Left;  nonunion metatarsal with bone graft         Family History:  No family history on file.    Social  History:  Social History     Tobacco Use    Smoking status: Current Every Day Smoker     Packs/day: 1.00     Types: Cigarettes    Smokeless tobacco: Never Used   Substance and Sexual Activity    Alcohol use: No    Drug use: No    Sexual activity: Not on file        Review of Systems     Review of Systems   Constitutional: Positive for fever. Negative for chills.   HENT: Negative for sore throat.    Respiratory: Negative for shortness of breath.    Cardiovascular: Negative for chest pain.   Gastrointestinal: Negative for nausea and vomiting.   Genitourinary: Negative for dysuria.   Musculoskeletal: Negative for back pain.   Skin: Negative for rash.   Neurological: Negative for weakness, numbness and headaches.   Hematological: Does not bruise/bleed easily.   All other systems reviewed and are negative.       Physical Exam     Initial Vitals [01/23/22 1546]   BP Pulse Resp Temp SpO2   (!) 142/101 107 18 98.5 °F (36.9 °C) 96 %      MAP       --          Physical Exam  Nursing Notes and Vital Signs Reviewed.  Constitutional: Patient is in no apparent distress. Morbidly obese. Nontoxic appearing.   Head: Atraumatic. Normocephalic.  Eyes: PERRL. EOM intact. Conjunctivae are not pale. No scleral icterus.  ENT: Mucous membranes are moist. Oropharynx is clear and symmetric.    Neck: Supple. Full ROM. No lymphadenopathy.  Cardiovascular: Regular rate. Regular rhythm. No murmurs, rubs, or gallops. Distal pulses are 2+ and symmetric.  Pulmonary/Chest: No respiratory distress. Clear to auscultation bilaterally. No wheezing or rales.  Abdominal: Soft and non-distended.  There is no tenderness.  No rebound, guarding, or rigidity. Good bowel sounds.  Genitourinary: No CVA tenderness  Musculoskeletal: Moves all extremities. No obvious deformities. No edema. No calf tenderness. Post op splint in place to L lower leg and foot, no foul odor. Can move toes. Brisk cap refill.  Skin: Warm and dry.  Neurological:  Alert, awake, and  appropriate.  Normal speech.  No acute focal neurological deficits are appreciated.  Psychiatric: Normal affect. Good eye contact. Appropriate in content.     ED Course   Procedures  ED Vital Signs:  Vitals:    01/23/22 1546 01/23/22 1837 01/23/22 1901 01/23/22 1932   BP: (!) 142/101 116/73 121/73 123/73   Pulse: 107 100 96 103   Resp: 18 (!) 22 (!) 21 13   Temp: 98.5 °F (36.9 °C)      TempSrc: Oral      SpO2: 96% 100% 97% 97%   Weight: 128.4 kg (283 lb)          Abnormal Lab Results:  Labs Reviewed   COMPREHENSIVE METABOLIC PANEL - Abnormal; Notable for the following components:       Result Value    Albumin 3.1 (*)     All other components within normal limits   URINALYSIS, REFLEX TO URINE CULTURE - Abnormal; Notable for the following components:    Specific Gravity, UA >=1.030 (*)     All other components within normal limits    Narrative:     Specimen Source->Urine   C-REACTIVE PROTEIN - Abnormal; Notable for the following components:    CRP 75.0 (*)     All other components within normal limits   CULTURE, BLOOD   CULTURE, BLOOD   CBC W/ AUTO DIFFERENTIAL   LACTIC ACID, PLASMA   PROCALCITONIN   SARS-COV-2 RDRP GENE    Narrative:     This test utilizes isothermal nucleic acid amplification   technology to detect the SARS-CoV-2 RdRp nucleic acid segment.   The analytical sensitivity (limit of detection) is 125 genome   equivalents/mL.   A POSITIVE result implies infection with the SARS-CoV-2 virus;   the patient is presumed to be contagious.     A NEGATIVE result means that SARS-CoV-2 nucleic acids are not   present above the limit of detection. A NEGATIVE result should be   treated as presumptive. It does not rule out the possibility of   COVID-19 and should not be the sole basis for treatment decisions.   If COVID-19 is strongly suspected based on clinical and exposure   history, re-testing using an alternate molecular assay should be   considered.   This test is only for use under the Food and Drug    Administration s Emergency Use Authorization (EUA).   Commercial kits are provided by [employer].   Performance characteristics of the EUA have been independently   verified by [facility] Department of   Pathology and Laboratory Medicine.   _________________________________________________________________   The authorized Fact Sheet for Healthcare Providers and the authorized Fact   Sheet for Patients of the ID NOW COVID-19 are available on the FDA   website:     https://www.fda.gov/media/207326/download  https://www.fda.gov/media/695212/download         POCT INFLUENZA A/B MOLECULAR        All Lab Results:  Results for orders placed or performed during the hospital encounter of 01/23/22   CBC auto differential   Result Value Ref Range    WBC 8.62 3.90 - 12.70 K/uL    RBC 4.35 4.00 - 5.40 M/uL    Hemoglobin 12.7 12.0 - 16.0 g/dL    Hematocrit 38.6 37.0 - 48.5 %    MCV 89 82 - 98 fL    MCH 29.2 27.0 - 31.0 pg    MCHC 32.9 32.0 - 36.0 g/dL    RDW 14.0 11.5 - 14.5 %    Platelets 256 150 - 450 K/uL    MPV 9.8 9.2 - 12.9 fL    Immature Granulocytes 0.3 0.0 - 0.5 %    Gran # (ANC) 5.8 1.8 - 7.7 K/uL    Immature Grans (Abs) 0.03 0.00 - 0.04 K/uL    Lymph # 1.8 1.0 - 4.8 K/uL    Mono # 0.6 0.3 - 1.0 K/uL    Eos # 0.3 0.0 - 0.5 K/uL    Baso # 0.05 0.00 - 0.20 K/uL    nRBC 0 0 /100 WBC    Gran % 67.5 38.0 - 73.0 %    Lymph % 21.0 18.0 - 48.0 %    Mono % 7.0 4.0 - 15.0 %    Eosinophil % 3.6 0.0 - 8.0 %    Basophil % 0.6 0.0 - 1.9 %    Differential Method Automated    Comprehensive metabolic panel   Result Value Ref Range    Sodium 140 136 - 145 mmol/L    Potassium 4.2 3.5 - 5.1 mmol/L    Chloride 100 95 - 110 mmol/L    CO2 28 23 - 29 mmol/L    Glucose 99 70 - 110 mg/dL    BUN 8 6 - 20 mg/dL    Creatinine 0.9 0.5 - 1.4 mg/dL    Calcium 8.8 8.7 - 10.5 mg/dL    Total Protein 6.9 6.0 - 8.4 g/dL    Albumin 3.1 (L) 3.5 - 5.2 g/dL    Total Bilirubin 0.2 0.1 - 1.0 mg/dL    Alkaline Phosphatase 101 55 - 135 U/L     AST 23 10 - 40 U/L    ALT 25 10 - 44 U/L    Anion Gap 12 8 - 16 mmol/L    eGFR if African American >60 >60 mL/min/1.73 m^2    eGFR if non African American >60 >60 mL/min/1.73 m^2   Lactic acid, plasma #1   Result Value Ref Range    Lactate (Lactic Acid) 1.5 0.5 - 2.2 mmol/L   Urinalysis, Reflex to Urine Culture Urine, Clean Catch    Specimen: Urine   Result Value Ref Range    Specimen UA Urine, Clean Catch     Color, UA Yellow Yellow, Straw, Nicky    Appearance, UA Clear Clear    pH, UA 6.0 5.0 - 8.0    Specific Gravity, UA >=1.030 (A) 1.005 - 1.030    Protein, UA Negative Negative    Glucose, UA Negative Negative    Ketones, UA Negative Negative    Bilirubin (UA) Negative Negative    Occult Blood UA Negative Negative    Nitrite, UA Negative Negative    Urobilinogen, UA Negative <2.0 EU/dL    Leukocytes, UA Negative Negative   C-reactive protein   Result Value Ref Range    CRP 75.0 (H) 0.0 - 8.2 mg/L   POCT COVID-19 Rapid Screening   Result Value Ref Range    POC Rapid COVID Negative Negative     Acceptable Yes    POCT Influenza A/B Molecular   Result Value Ref Range    POC Molecular Influenza A Ag Negative Negative, Not Reported    POC Molecular Influenza B Ag Negative Negative, Not Reported     Acceptable Yes          Imaging Results:  Imaging Results          X-Ray Foot Complete Left (Final result)  Result time 01/23/22 19:37:50    Final result by An Nobles MD (01/23/22 19:37:50)                 Impression:      As above      Electronically signed by: Giuliano Nolan  Date:    01/23/2022  Time:    19:37             Narrative:    EXAMINATION:  XR FOOT COMPLETE 3 VIEW LEFT    CLINICAL HISTORY:  .  Other acute postprocedural pain    TECHNIQUE:  AP, lateral and oblique views of the left foot were performed.    COMPARISON:  None    FINDINGS:  Casting material reduces osseous detail.  Interval plate and screw fixation at the lateral aspect of the 5th metatarsal.  Skin staples overlying  the medial foot posterior.  Calcaneal spurring noted.                               X-Ray Chest AP Portable (Final result)  Result time 01/23/22 16:20:41    Final result by An Nobles MD (01/23/22 16:20:41)                 Impression:      No acute abnormality.      Electronically signed by: Giuliano Nolan  Date:    01/23/2022  Time:    16:20             Narrative:    EXAMINATION:  XR CHEST AP PORTABLE    CLINICAL HISTORY:  Sepsis;    TECHNIQUE:  Single frontal view of the chest was performed.    COMPARISON:  None    FINDINGS:  The lungs are clear, with normal appearance of pulmonary vasculature and no pleural effusion or pneumothorax.    The cardiac silhouette is normal in size. The hilar and mediastinal contours are unremarkable.    Bones are intact.                                 The EKG was ordered, reviewed, and independently interpreted by the ED provider.  Interpretation time: 18:27  Rate: 97 BPM  Rhythm: normal sinus rhythm  Interpretation: Abnormal QRS-T angle, Conider primary T wave abnomrality. Abnormal ECG. No STEMI.             The Emergency Provider reviewed the vital signs and test results, which are outlined above.     ED Discussion       7:52 PM: No obvious source of infection. No fever in the ED and all sepsis marks are negative. Post op dressing and and splint were not taken down. Advised pt to follow up with podiatrist if fever persists.      7:54 PM: Reassessed pt at this time. Discussed with pt all pertinent ED information and results. Discussed pt dx of  and plan of tx. Gave pt all f/u and return to the ED instructions. All questions and concerns were addressed at this time. Pt expresses understanding of information and instructions, and is comfortable with plan to discharge. Pt is stable for discharge.           Medical Decision Making:   Clinical Tests:   Lab Tests: Ordered and Reviewed  Radiological Study: Ordered and Reviewed  Medical Tests: Ordered and Reviewed           ED  Medication(s):  Medications - No data to display    New Prescriptions    No medications on file        Follow-up Information     Colton Esteves DPM. Schedule an appointment as soon as possible for a visit in 1 day.    Specialty: Podiatry  Why: Return to the Emergency Room, If symptoms worsen  Contact information:  [address] [place] [address] Dr Valentina ISBELL [phone]  [phone]                             Scribe Attestation:   Scribe #1: I performed the above scribed service and the documentation accurately describes the services I performed. I attest to the accuracy of the note.     Attending:   Physician Attestation Statement for Scribe #1: I, Carissa Knight MD, personally performed the services described in this documentation, as scribed by Ashwini Smith, in my presence, and it is both accurate and complete.           Clinical Impression       ICD-10-CM ICD-9-CM   1. Post-procedural fever  R50.82 780.62   2. Sepsis  A41.9 038.9     995.91   3. Post-op pain  G89.18 338.18   4. History of open reduction and internal fixation (ORIF) procedure  Z98.890 V15.29       Disposition:   Disposition: Discharged  Condition: Stable         Carissa Knight MD  01/23/22 1957

## 2022-01-29 LAB
BACTERIA BLD CULT: NORMAL
BACTERIA BLD CULT: NORMAL

## 2022-02-02 ENCOUNTER — PATIENT MESSAGE (OUTPATIENT)
Dept: PODIATRY | Facility: CLINIC | Age: 42
End: 2022-02-02

## 2022-02-02 ENCOUNTER — OFFICE VISIT (OUTPATIENT)
Dept: PODIATRY | Facility: CLINIC | Age: 42
End: 2022-02-02
Payer: MEDICAID

## 2022-02-02 DIAGNOSIS — Z72.0 TOBACCO ABUSE: ICD-10-CM

## 2022-02-02 DIAGNOSIS — S92.352K: ICD-10-CM

## 2022-02-02 DIAGNOSIS — Z09 POSTOP CHECK: Primary | ICD-10-CM

## 2022-02-02 DIAGNOSIS — E11.9 TYPE 2 DIABETES MELLITUS WITHOUT COMPLICATION, WITHOUT LONG-TERM CURRENT USE OF INSULIN: ICD-10-CM

## 2022-02-02 DIAGNOSIS — M79.672 PAIN OF LEFT FOOT: ICD-10-CM

## 2022-02-02 DIAGNOSIS — E66.01 MORBID OBESITY: ICD-10-CM

## 2022-02-02 PROCEDURE — 1160F PR REVIEW ALL MEDS BY PRESCRIBER/CLIN PHARMACIST DOCUMENTED: ICD-10-PCS | Mod: CPTII,,, | Performed by: PODIATRIST

## 2022-02-02 PROCEDURE — 99212 OFFICE O/P EST SF 10 MIN: CPT | Mod: PBBFAC | Performed by: PODIATRIST

## 2022-02-02 PROCEDURE — 99024 PR POST-OP FOLLOW-UP VISIT: ICD-10-PCS | Mod: ,,, | Performed by: PODIATRIST

## 2022-02-02 PROCEDURE — 99024 POSTOP FOLLOW-UP VISIT: CPT | Mod: ,,, | Performed by: PODIATRIST

## 2022-02-02 PROCEDURE — 1159F PR MEDICATION LIST DOCUMENTED IN MEDICAL RECORD: ICD-10-PCS | Mod: CPTII,,, | Performed by: PODIATRIST

## 2022-02-02 PROCEDURE — 99999 PR PBB SHADOW E&M-EST. PATIENT-LVL II: ICD-10-PCS | Mod: PBBFAC,,, | Performed by: PODIATRIST

## 2022-02-02 PROCEDURE — 1159F MED LIST DOCD IN RCRD: CPT | Mod: CPTII,,, | Performed by: PODIATRIST

## 2022-02-02 PROCEDURE — 1160F RVW MEDS BY RX/DR IN RCRD: CPT | Mod: CPTII,,, | Performed by: PODIATRIST

## 2022-02-02 PROCEDURE — 99999 PR PBB SHADOW E&M-EST. PATIENT-LVL II: CPT | Mod: PBBFAC,,, | Performed by: PODIATRIST

## 2022-02-02 RX ORDER — ERGOCALCIFEROL 1.25 MG/1
50000 CAPSULE ORAL
Qty: 4 CAPSULE | Refills: 2 | Status: SHIPPED | OUTPATIENT
Start: 2022-02-02 | End: 2022-02-24

## 2022-02-02 NOTE — PROGRESS NOTES
Subjective:       Patient ID: Mara Mosley is a 41 y.o. female.    Chief Complaint: Post-op Evaluation (DOS: 01/19/2022 ORIF, FRACTURE, METATARSAL BONE, Left. 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 Patient does continue to smoke cigarettes, approximately 5-6 (1/2 PPD at times) daily. DM is controlled. DVT PPx with Xatelto. NWB with wheelchair. RICE therapy is stated.  is present.     Hemoglobin A1C   Date Value Ref Range Status   12/27/2021 6.0 (H) 4.0 - 5.6 % Final     Comment:     ADA Screening Guidelines:  5.7-6.4%  Consistent with prediabetes  >or=6.5%  Consistent with diabetes    High levels of fetal hemoglobin interfere with the HbA1C  assay. Heterozygous hemoglobin variants (HbS, HgC, etc)do  not significantly interfere with this assay.   However, presence of multiple variants may affect accuracy.         Review of patient's allergies indicates:  No Known Allergies    Past Medical History:   Diagnosis Date    Arthritis     Diabetes mellitus     Fractures        No family history on file.    Social History     Socioeconomic History    Marital status:    Tobacco Use    Smoking status: Current Every Day Smoker     Packs/day: 1.00     Types: Cigarettes    Smokeless tobacco: Never Used   Substance and Sexual Activity    Alcohol use: No    Drug use: No       Past Surgical History:   Procedure Laterality Date    ENDOMETRIAL ABLATION      OPEN REDUCTION AND INTERNAL FIXATION (ORIF) OF FRACTURE OF METATARSAL BONE Left 1/19/2022    Procedure: ORIF, FRACTURE, METATARSAL BONE;  Surgeon: Colton Esteves DPM;  Location: [facility];  Service: Podiatry;  Laterality: Left;  nonunion metatarsal with bone graft       Review of Systems   Constitutional: Negative for chills, fatigue and fever.   HENT: Negative for hearing loss.    Eyes: Negative for photophobia  and visual disturbance.   Respiratory: Negative for cough, chest tightness, shortness of breath and wheezing.    Cardiovascular: Negative for chest pain and palpitations.   Gastrointestinal: Negative for constipation, diarrhea, nausea and vomiting.   Endocrine: Negative for cold intolerance and heat intolerance.   Genitourinary: Negative for flank pain.   Musculoskeletal: Positive for gait problem. Negative for neck pain and neck stiffness.   Skin: Negative for wound.   Neurological: Negative for light-headedness and headaches.   Psychiatric/Behavioral: Negative for sleep disturbance.          Objective:   There were no vitals taken for this visit.    X-Ray Foot Complete Left  Narrative: EXAMINATION:  XR FOOT COMPLETE 3 VIEW LEFT    CLINICAL HISTORY:  .  Other acute postprocedural pain    TECHNIQUE:  AP, lateral and oblique views of the left foot were performed.    COMPARISON:  None    FINDINGS:  Casting material reduces osseous detail.  Interval plate and screw fixation at the lateral aspect of the 5th metatarsal.  Skin staples overlying the medial foot posterior.  Calcaneal spurring noted.  Impression: As above    Electronically signed by: Giuliano Nolan  Date:    01/23/2022  Time:    19:37  X-Ray Chest AP Portable  Narrative: EXAMINATION:  XR CHEST AP PORTABLE    CLINICAL HISTORY:  Sepsis;    TECHNIQUE:  Single frontal view of the chest was performed.    COMPARISON:  None    FINDINGS:  The lungs are clear, with normal appearance of pulmonary vasculature and no pleural effusion or pneumothorax.    The cardiac silhouette is normal in size. The hilar and mediastinal contours are unremarkable.    Bones are intact.  Impression: No acute abnormality.    Electronically signed by: Giuliano Nolan  Date:    01/23/2022  Time:    16:20       Physical Exam  LOWER EXTREMITY PHYSICAL EXAMINATION     VASCULAR: No rubor is present. Proximal to distal temperature is warm to warm. No ipsilateral calf pain or tenderness is noted with  palpation and compression. No palpable cords noted.    DERMATOLOGY: No evidence of wound healing complications. No infection is noted.    ORTHOPEDIC: Mild pains to palpation. Mild edema is noted.     Assessment:     1. Postop check    2. 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 Pain of left foot    4. Tobacco abuse    5. Type 2 diabetes mellitus without complication, without long-term current use of insulin    6. 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 for 4 doses  Dispense: 4 capsule; Refill: 2    Pain of left foot  -     X-Ray Foot Complete Left; Future; Expected date: 02/09/2022    Tobacco abuse    Type 2 diabetes mellitus without complication, without long-term current use of insulin    Morbid obesity      Thorough discussion is had with the patient today, concerning the diagnosis, its etiology, and the treatment algorithm at present.     XRAYS are reviewed in detail with the patient. All questions and concerns regarding findings and its/their implications are outlined and discussed.    Continue NWB at present with CAM boot.    Sutures remain intact. Local wound care with betadine and DSD.    Thorough discussion is had with the patient today, concerning the diagnosis, its etiology, and the treatment algorithm at present. Unna Boot application to the LLE in standard fashion.  Patient may remove the Unna Boot one to two days prior to next appt.     CAM Walker (Walking Boot), short/tall is dispensed to the patient. The CAM Walker is appropriately fitted and customized to the patient's lower  extremity physique by the LPN/MA. Patient to ambulate with the CAM Walker at all times. The patient should not sleep with the device or shower with the device, or drive with the device (if dispensed for right ankle/foot pathology).     Did discuss in detail the harmful effects of nicotine/tobacco/cigarette/ marijuana smoking, especially in relation to the lower extremity. I do rec. consultation with primary care provider for further discussed of smoking cessation methods. Smoking & Tobacco use cessation couseling was rendered at today's visit; intermediate, bewteen 3 and 10 minutes.    Strict DMII control.    Start Bone Stimulator.    Return to work note is given.    RICE therapy.    Xarelto for DVT PPx.          Future Appointments   Date Time Provider Department Center   2/17/2022  7:45 AM TRISHA ROBERTSON-DR [facility]   2/17/2022  8:15 AM Colton Esteves DPM [facility]

## 2022-02-02 NOTE — LETTER
February 2, 2022      [facility]  [address] [place] [address]  [facility] [address]  Phone: [phone]  Fax: [phone]       Patient: Mara Mosley   YOB: 1980  Date of Visit: 02/02/2022    To Whom It May Concern:    Chavo Mosley  was at [facility] on 02/02/2022. The patient may return to work/school on 02/03/2022 with no restrictions. If you have any questions or concerns, or if I can be of further assistance, please do not hesitate to contact me.    Sincerely,    Kay Samuel MA

## 2022-02-17 ENCOUNTER — OFFICE VISIT (OUTPATIENT)
Dept: PODIATRY | Facility: CLINIC | Age: 42
End: 2022-02-17
Payer: MEDICAID

## 2022-02-17 ENCOUNTER — HOSPITAL ENCOUNTER (OUTPATIENT)
Dept: RADIOLOGY | Facility: HOSPITAL | Age: 42
Discharge: HOME | End: 2022-02-17
Attending: PODIATRIST
Payer: MEDICAID

## 2022-02-17 VITALS — WEIGHT: 283.06 LBS | HEIGHT: 69 IN | BODY MASS INDEX: 41.92 KG/M2

## 2022-02-17 DIAGNOSIS — E11.9 TYPE 2 DIABETES MELLITUS WITHOUT COMPLICATION, WITHOUT LONG-TERM CURRENT USE OF INSULIN: ICD-10-CM

## 2022-02-17 DIAGNOSIS — Z09 POSTOP CHECK: ICD-10-CM

## 2022-02-17 DIAGNOSIS — M79.672 PAIN OF LEFT FOOT: ICD-10-CM

## 2022-02-17 DIAGNOSIS — Z72.0 TOBACCO ABUSE: ICD-10-CM

## 2022-02-17 DIAGNOSIS — S92.352K: ICD-10-CM

## 2022-02-17 DIAGNOSIS — Z09 POSTOP CHECK: Primary | ICD-10-CM

## 2022-02-17 DIAGNOSIS — E66.01 MORBID OBESITY: ICD-10-CM

## 2022-02-17 PROCEDURE — 73630 X-RAY EXAM OF FOOT: CPT | Mod: TC,LT

## 2022-02-17 PROCEDURE — 1160F PR REVIEW ALL MEDS BY PRESCRIBER/CLIN PHARMACIST DOCUMENTED: ICD-10-PCS | Mod: CPTII,,, | Performed by: PODIATRIST

## 2022-02-17 PROCEDURE — 3008F PR BODY MASS INDEX (BMI) DOCUMENTED: ICD-10-PCS | Mod: CPTII,,, | Performed by: PODIATRIST

## 2022-02-17 PROCEDURE — 99999 PR PBB SHADOW E&M-EST. PATIENT-LVL III: CPT | Mod: PBBFAC,,, | Performed by: PODIATRIST

## 2022-02-17 PROCEDURE — 1159F PR MEDICATION LIST DOCUMENTED IN MEDICAL RECORD: ICD-10-PCS | Mod: CPTII,,, | Performed by: PODIATRIST

## 2022-02-17 PROCEDURE — 99024 PR POST-OP FOLLOW-UP VISIT: ICD-10-PCS | Mod: ,,, | Performed by: PODIATRIST

## 2022-02-17 PROCEDURE — 3008F BODY MASS INDEX DOCD: CPT | Mod: CPTII,,, | Performed by: PODIATRIST

## 2022-02-17 PROCEDURE — 99024 POSTOP FOLLOW-UP VISIT: CPT | Mod: ,,, | Performed by: PODIATRIST

## 2022-02-17 PROCEDURE — 99213 OFFICE O/P EST LOW 20 MIN: CPT | Mod: PBBFAC | Performed by: PODIATRIST

## 2022-02-17 PROCEDURE — 1160F RVW MEDS BY RX/DR IN RCRD: CPT | Mod: CPTII,,, | Performed by: PODIATRIST

## 2022-02-17 PROCEDURE — 73630 XR FOOT COMPLETE 3 VIEW LEFT: ICD-10-PCS | Mod: 26,LT,, | Performed by: RADIOLOGY

## 2022-02-17 PROCEDURE — 1159F MED LIST DOCD IN RCRD: CPT | Mod: CPTII,,, | Performed by: PODIATRIST

## 2022-02-17 PROCEDURE — 99999 PR PBB SHADOW E&M-EST. PATIENT-LVL III: ICD-10-PCS | Mod: PBBFAC,,, | Performed by: PODIATRIST

## 2022-02-17 PROCEDURE — 73630 X-RAY EXAM OF FOOT: CPT | Mod: 26,LT,, | Performed by: RADIOLOGY

## 2022-02-17 RX ORDER — OXYCODONE AND ACETAMINOPHEN 10; 325 MG/1; MG/1
1 TABLET ORAL EVERY 8 HOURS PRN
Qty: 30 TABLET | Refills: 0 | Status: SHIPPED | OUTPATIENT
Start: 2022-02-17 | End: 2022-02-27

## 2022-02-17 NOTE — PROGRESS NOTES
Subjective:       Patient ID: Mara Mosley is a 41 y.o. female.    Chief Complaint: Follow-up (DOS: 01/19/22 ORIF, FRACTURE, METATARSAL BONE. Non- diabetic pt, PCP: [facility] last seen 01/15/22 per pt. 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 Patient does continue to smoke cigarettes. DM is controlled. DVT PPx with Xatelto. NWB with knee scooter. Seldom RICE therapy is stated.  is present. Did have XR this morning. Using bone stimulator. Did return to work.     Hemoglobin A1C   Date Value Ref Range Status   12/27/2021 6.0 (H) 4.0 - 5.6 % Final     Comment:     ADA Screening Guidelines:  5.7-6.4%  Consistent with prediabetes  >or=6.5%  Consistent with diabetes    High levels of fetal hemoglobin interfere with the HbA1C  assay. Heterozygous hemoglobin variants (HbS, HgC, etc)do  not significantly interfere with this assay.   However, presence of multiple variants may affect accuracy.         Review of patient's allergies indicates:  No Known Allergies    Past Medical History:   Diagnosis Date    Arthritis     Diabetes mellitus     Fractures        History reviewed. No pertinent family history.    Social History     Socioeconomic History    Marital status:    Tobacco Use    Smoking status: Current Every Day Smoker     Packs/day: 1.00     Types: Cigarettes    Smokeless tobacco: Never Used   Substance and Sexual Activity    Alcohol use: No    Drug use: No       Past Surgical History:   Procedure Laterality Date    ENDOMETRIAL ABLATION      OPEN REDUCTION AND INTERNAL FIXATION (ORIF) OF FRACTURE OF METATARSAL BONE Left 1/19/2022    Procedure: ORIF, FRACTURE, METATARSAL BONE;  Surgeon: Colton Esteves DPM;  Location: [facility];  Service: Podiatry;  Laterality: Left;  nonunion metatarsal with bone graft       Review of Systems   Constitutional: Negative for chills, fatigue and fever.  "  HENT: Negative for hearing loss.    Eyes: Negative for photophobia and visual disturbance.   Respiratory: Negative for cough, chest tightness, shortness of breath and wheezing.    Cardiovascular: Negative for chest pain and palpitations.   Gastrointestinal: Negative for constipation, diarrhea, nausea and vomiting.   Endocrine: Negative for cold intolerance and heat intolerance.   Genitourinary: Negative for flank pain.   Musculoskeletal: Positive for gait problem. Negative for neck pain and neck stiffness.   Skin: Negative for wound.   Neurological: Negative for light-headedness and headaches.   Psychiatric/Behavioral: Negative for sleep disturbance.          Objective:   Ht 5' 9" (1.753 m)   Wt 128.4 kg (283 lb 1.1 oz)   BMI 41.80 kg/m²       Physical Exam  LOWER EXTREMITY PHYSICAL EXAMINATION     VASCULAR: No rubor is present. Proximal to distal temperature is warm to warm. No ipsilateral calf pain or tenderness is noted with palpation and compression. No palpable cords noted.    DERMATOLOGY: Skin is supple, dry and intact. Minor delayed epidermal healing on all incision sites.     ORTHOPEDIC: Mild pains to palpation. Mild edema is noted.     Assessment:     1. Postop check    2. 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 Pain of left foot    4. Tobacco abuse    5. Type 2 diabetes mellitus without complication, without long-term current use of insulin    6. Morbid obesity          Plan:     Postop check  -     X-Ray Foot Complete Left; Future; Expected date: 03/03/2022  -     oxyCODONE-acetaminophen (PERCOCET)  mg per tablet; Take 1 tablet by mouth every 8 (eight) hours as needed for Pain.  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  Dispense: 30 tablet; Refill: 0    Tobacco abuse    Type 2 diabetes mellitus without complication, without long-term current use of insulin    Morbid obesity      Thorough discussion is had with the patient today, concerning the diagnosis, its etiology, and the treatment algorithm at present.     XRAYS are reviewed in detail with the patient. All questions and concerns regarding findings and its/their implications are outlined and discussed.    Continue NWB at present with CAM boot and scooter.    Did discuss in detail the harmful effects of nicotine/tobacco/cigarette/ marijuana smoking, especially in relation to the lower extremity. I do rec. consultation with primary care provider for further discussed of smoking cessation methods. Smoking & Tobacco use cessation couseling was rendered at today's visit; intermediate, bewteen 3 and 10 minutes.    Strict DMII control.    Bone Stimulator.    RICE therapy.    Xarelto for DVT PPx.    The wound was surgically debrided after adequate prep with alcohol and/or betadine paint. Selective wound debridement was performed using sharp #10/#15 blade/rounded scalpel and tissue nipper, with removal of all non-viable skin and soft tissues; necrotic skin/tissue formation. Post debridement measurements are as above. Hemostasis was achieved. No viable tissues were debrided. Patient tolerated procedure well and without complications. Local woundcare with collagen dressings and bandage applied thereafter.           Future Appointments   Date Time Provider Department Center   3/11/2022  8:45 AM [facility] XR1-DR [facility]   3/11/2022  9:00 AM Colton Esteves DPM [facility]

## 2022-02-28 ENCOUNTER — PATIENT MESSAGE (OUTPATIENT)
Dept: PODIATRY | Facility: CLINIC | Age: 42
End: 2022-02-28
Payer: MEDICAID

## 2022-03-09 ENCOUNTER — PATIENT MESSAGE (OUTPATIENT)
Dept: PODIATRY | Facility: CLINIC | Age: 42
End: 2022-03-09
Payer: MEDICAID

## 2022-03-09 ENCOUNTER — PATIENT MESSAGE (OUTPATIENT)
Dept: ADMINISTRATIVE | Facility: OTHER | Age: 42
End: 2022-03-09
Payer: MEDICAID

## 2022-03-11 ENCOUNTER — OFFICE VISIT (OUTPATIENT)
Dept: PODIATRY | Facility: CLINIC | Age: 42
End: 2022-03-11
Payer: MEDICAID

## 2022-03-11 ENCOUNTER — HOSPITAL ENCOUNTER (OUTPATIENT)
Dept: RADIOLOGY | Facility: HOSPITAL | Age: 42
Discharge: HOME | End: 2022-03-11
Attending: PODIATRIST
Payer: MEDICAID

## 2022-03-11 VITALS — WEIGHT: 283.06 LBS | HEIGHT: 69 IN | BODY MASS INDEX: 41.92 KG/M2

## 2022-03-11 DIAGNOSIS — M79.672 PAIN OF LEFT FOOT: ICD-10-CM

## 2022-03-11 DIAGNOSIS — Z09 POSTOP CHECK: Primary | ICD-10-CM

## 2022-03-11 DIAGNOSIS — Z72.0 TOBACCO ABUSE: ICD-10-CM

## 2022-03-11 DIAGNOSIS — E11.9 TYPE 2 DIABETES MELLITUS WITHOUT COMPLICATION, WITHOUT LONG-TERM CURRENT USE OF INSULIN: ICD-10-CM

## 2022-03-11 DIAGNOSIS — E66.01 MORBID OBESITY: ICD-10-CM

## 2022-03-11 DIAGNOSIS — Z09 POSTOP CHECK: ICD-10-CM

## 2022-03-11 DIAGNOSIS — S92.352K: ICD-10-CM

## 2022-03-11 PROCEDURE — 73630 X-RAY EXAM OF FOOT: CPT | Mod: TC,LT

## 2022-03-11 PROCEDURE — 3008F BODY MASS INDEX DOCD: CPT | Mod: CPTII,,, | Performed by: PODIATRIST

## 2022-03-11 PROCEDURE — 1159F MED LIST DOCD IN RCRD: CPT | Mod: CPTII,,, | Performed by: PODIATRIST

## 2022-03-11 PROCEDURE — 1159F PR MEDICATION LIST DOCUMENTED IN MEDICAL RECORD: ICD-10-PCS | Mod: CPTII,,, | Performed by: PODIATRIST

## 2022-03-11 PROCEDURE — 99024 PR POST-OP FOLLOW-UP VISIT: ICD-10-PCS | Mod: ,,, | Performed by: PODIATRIST

## 2022-03-11 PROCEDURE — 1160F RVW MEDS BY RX/DR IN RCRD: CPT | Mod: CPTII,,, | Performed by: PODIATRIST

## 2022-03-11 PROCEDURE — 99999 PR PBB SHADOW E&M-EST. PATIENT-LVL III: CPT | Mod: PBBFAC,,, | Performed by: PODIATRIST

## 2022-03-11 PROCEDURE — 3008F PR BODY MASS INDEX (BMI) DOCUMENTED: ICD-10-PCS | Mod: CPTII,,, | Performed by: PODIATRIST

## 2022-03-11 PROCEDURE — 99999 PR PBB SHADOW E&M-EST. PATIENT-LVL III: ICD-10-PCS | Mod: PBBFAC,,, | Performed by: PODIATRIST

## 2022-03-11 PROCEDURE — 99024 POSTOP FOLLOW-UP VISIT: CPT | Mod: ,,, | Performed by: PODIATRIST

## 2022-03-11 PROCEDURE — 99213 OFFICE O/P EST LOW 20 MIN: CPT | Mod: PBBFAC | Performed by: PODIATRIST

## 2022-03-11 PROCEDURE — 1160F PR REVIEW ALL MEDS BY PRESCRIBER/CLIN PHARMACIST DOCUMENTED: ICD-10-PCS | Mod: CPTII,,, | Performed by: PODIATRIST

## 2022-03-11 PROCEDURE — 73630 XR FOOT COMPLETE 3 VIEW LEFT: ICD-10-PCS | Mod: 26,LT,, | Performed by: RADIOLOGY

## 2022-03-11 PROCEDURE — 73630 X-RAY EXAM OF FOOT: CPT | Mod: 26,LT,, | Performed by: RADIOLOGY

## 2022-03-11 RX ORDER — ERGOCALCIFEROL 1.25 MG/1
50000 CAPSULE ORAL
COMMUNITY
Start: 2022-02-28

## 2022-03-11 NOTE — PROGRESS NOTES
Subjective:       Patient ID: Mara Mosley is a 41 y.o. female.    Chief Complaint: Post-op Evaluation (DOS: 01/19/22 ORIF, FRACTURE, METATARSAL BONE LEFT. 0/10 pain at present to.. Non diabetic PCP: [facility] last seen 01/15/22 per pt. 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 Patient does continue to smoke cigarettes. DM is controlled. DVT PPx with Xatelto. NWB with knee scooter. Seldom RICE therapy is stated. Did have XR this morning. Using bone stimulator.     Hemoglobin A1C   Date Value Ref Range Status   12/27/2021 6.0 (H) 4.0 - 5.6 % Final     Comment:     ADA Screening Guidelines:  5.7-6.4%  Consistent with prediabetes  >or=6.5%  Consistent with diabetes    High levels of fetal hemoglobin interfere with the HbA1C  assay. Heterozygous hemoglobin variants (HbS, HgC, etc)do  not significantly interfere with this assay.   However, presence of multiple variants may affect accuracy.         Review of patient's allergies indicates:  No Known Allergies    Past Medical History:   Diagnosis Date    Arthritis     Diabetes mellitus     Fractures        History reviewed. No pertinent family history.    Social History     Socioeconomic History    Marital status:    Tobacco Use    Smoking status: Current Every Day Smoker     Packs/day: 1.00     Types: Cigarettes    Smokeless tobacco: Never Used   Substance and Sexual Activity    Alcohol use: No    Drug use: No       Past Surgical History:   Procedure Laterality Date    ENDOMETRIAL ABLATION      OPEN REDUCTION AND INTERNAL FIXATION (ORIF) OF FRACTURE OF METATARSAL BONE Left 1/19/2022    Procedure: ORIF, FRACTURE, METATARSAL BONE;  Surgeon: Colton Esteves DPM;  Location: [facility];  Service: Podiatry;  Laterality: Left;  nonunion metatarsal with bone graft       Review of Systems   Constitutional: Negative for chills, fatigue and fever.  "  HENT: Negative for hearing loss.    Eyes: Negative for photophobia and visual disturbance.   Respiratory: Negative for cough, chest tightness, shortness of breath and wheezing.    Cardiovascular: Negative for chest pain and palpitations.   Gastrointestinal: Negative for constipation, diarrhea, nausea and vomiting.   Endocrine: Negative for cold intolerance and heat intolerance.   Genitourinary: Negative for flank pain.   Musculoskeletal: Positive for gait problem. Negative for neck pain and neck stiffness.   Skin: Negative for wound.   Neurological: Negative for light-headedness and headaches.   Psychiatric/Behavioral: Negative for sleep disturbance.          Objective:   Ht 5' 9" (1.753 m)   Wt 128.4 kg (283 lb 1.1 oz)   BMI 41.80 kg/m²       Physical Exam  LOWER EXTREMITY PHYSICAL EXAMINATION     VASCULAR: No rubor is present. Proximal to distal temperature is warm to warm. No ipsilateral calf pain or tenderness is noted with palpation and compression. No palpable cords noted.    DERMATOLOGY: Skin is supple, dry and intact.    ORTHOPEDIC: Mild pains to palpation. Mild edema is noted.     Assessment:     1. Postop check    2. 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 Pain of left foot    4. Type 2 diabetes mellitus without complication, without long-term current use of insulin    5. Morbid obesity    6. 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     XRAYS are reviewed in detail with the patient. All questions and concerns regarding findings and its/their implications are outlined and discussed.    Start WB at present with CAM boot and KATHY RYDER.     Did discuss in detail the harmful effects of nicotine/tobacco/cigarette/ marijuana smoking, especially in relation to the lower extremity. I do rec. consultation with primary care provider for further discussed of smoking cessation methods. Smoking & Tobacco use cessation couseling was rendered at today's visit; intermediate, bewteen 3 and 10 minutes.    Strict DMII control.    Bone Stimulator.    RICE therapy.    D/C Xarelto for DVT PPx.          Future Appointments   Date Time Provider Department Center   3/11/2022  9:00 AM Colton Esteves DPM [facility]

## 2022-03-21 ENCOUNTER — PATIENT MESSAGE (OUTPATIENT)
Dept: PODIATRY | Facility: CLINIC | Age: 42
End: 2022-03-21
Payer: MEDICAID

## 2022-03-21 DIAGNOSIS — M62.838 MUSCLE SPASM: ICD-10-CM

## 2022-03-21 DIAGNOSIS — M62.838 MUSCLE SPASTICITY: Primary | ICD-10-CM

## 2022-03-21 RX ORDER — METHOCARBAMOL 750 MG/1
1500 TABLET, FILM COATED ORAL 3 TIMES DAILY
Qty: 60 TABLET | Refills: 1 | Status: SHIPPED | OUTPATIENT
Start: 2022-03-21 | End: 2022-03-31

## 2022-04-04 ENCOUNTER — OFFICE VISIT (OUTPATIENT)
Dept: PODIATRY | Facility: CLINIC | Age: 42
End: 2022-04-04
Payer: MEDICAID

## 2022-04-04 ENCOUNTER — HOSPITAL ENCOUNTER (OUTPATIENT)
Dept: RADIOLOGY | Facility: HOSPITAL | Age: 42
Discharge: HOME | End: 2022-04-04
Attending: PODIATRIST
Payer: MEDICAID

## 2022-04-04 VITALS — WEIGHT: 283.06 LBS | BODY MASS INDEX: 41.92 KG/M2 | HEIGHT: 69 IN

## 2022-04-04 DIAGNOSIS — Z09 POSTOP CHECK: ICD-10-CM

## 2022-04-04 DIAGNOSIS — E11.9 TYPE 2 DIABETES MELLITUS WITHOUT COMPLICATION, WITHOUT LONG-TERM CURRENT USE OF INSULIN: ICD-10-CM

## 2022-04-04 DIAGNOSIS — M79.672 PAIN OF LEFT FOOT: ICD-10-CM

## 2022-04-04 DIAGNOSIS — Z72.0 TOBACCO ABUSE: ICD-10-CM

## 2022-04-04 DIAGNOSIS — Z09 POSTOP CHECK: Primary | ICD-10-CM

## 2022-04-04 DIAGNOSIS — S92.352K: ICD-10-CM

## 2022-04-04 DIAGNOSIS — E66.01 MORBID OBESITY: ICD-10-CM

## 2022-04-04 PROCEDURE — 99999 PR PBB SHADOW E&M-EST. PATIENT-LVL III: ICD-10-PCS | Mod: PBBFAC,,, | Performed by: PODIATRIST

## 2022-04-04 PROCEDURE — 1159F MED LIST DOCD IN RCRD: CPT | Mod: CPTII,,, | Performed by: PODIATRIST

## 2022-04-04 PROCEDURE — 99024 PR POST-OP FOLLOW-UP VISIT: ICD-10-PCS | Mod: ,,, | Performed by: PODIATRIST

## 2022-04-04 PROCEDURE — 3008F BODY MASS INDEX DOCD: CPT | Mod: CPTII,,, | Performed by: PODIATRIST

## 2022-04-04 PROCEDURE — 3008F PR BODY MASS INDEX (BMI) DOCUMENTED: ICD-10-PCS | Mod: CPTII,,, | Performed by: PODIATRIST

## 2022-04-04 PROCEDURE — 99024 POSTOP FOLLOW-UP VISIT: CPT | Mod: ,,, | Performed by: PODIATRIST

## 2022-04-04 PROCEDURE — 73630 X-RAY EXAM OF FOOT: CPT | Mod: TC,LT

## 2022-04-04 PROCEDURE — 99999 PR PBB SHADOW E&M-EST. PATIENT-LVL III: CPT | Mod: PBBFAC,,, | Performed by: PODIATRIST

## 2022-04-04 PROCEDURE — 1160F RVW MEDS BY RX/DR IN RCRD: CPT | Mod: CPTII,,, | Performed by: PODIATRIST

## 2022-04-04 PROCEDURE — 73630 XR FOOT COMPLETE 3 VIEW LEFT: ICD-10-PCS | Mod: 26,LT,, | Performed by: RADIOLOGY

## 2022-04-04 PROCEDURE — 1159F PR MEDICATION LIST DOCUMENTED IN MEDICAL RECORD: ICD-10-PCS | Mod: CPTII,,, | Performed by: PODIATRIST

## 2022-04-04 PROCEDURE — 73630 X-RAY EXAM OF FOOT: CPT | Mod: 26,LT,, | Performed by: RADIOLOGY

## 2022-04-04 PROCEDURE — 99213 OFFICE O/P EST LOW 20 MIN: CPT | Mod: PBBFAC | Performed by: PODIATRIST

## 2022-04-04 PROCEDURE — 1160F PR REVIEW ALL MEDS BY PRESCRIBER/CLIN PHARMACIST DOCUMENTED: ICD-10-PCS | Mod: CPTII,,, | Performed by: PODIATRIST

## 2022-04-04 NOTE — PROGRESS NOTES
Subjective:       Patient ID: Mara Mosley is a 41 y.o. female.    Chief Complaint: Post-op Evaluation (DOS 01/19/2022 Orif Fracture Left Fifth Metatarsal Bone. 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 Patient does continue to smoke cigarettes. DM is controlled. WB with [employer]irc[employer]. RICE therapy is stated. Did have XR this morning. Using bone stimulator.     Hemoglobin A1C   Date Value Ref Range Status   12/27/2021 6.0 (H) 4.0 - 5.6 % Final     Comment:     ADA Screening Guidelines:  5.7-6.4%  Consistent with prediabetes  >or=6.5%  Consistent with diabetes    High levels of fetal hemoglobin interfere with the HbA1C  assay. Heterozygous hemoglobin variants (HbS, HgC, etc)do  not significantly interfere with this assay.   However, presence of multiple variants may affect accuracy.         Review of patient's allergies indicates:  No Known Allergies    Past Medical History:   Diagnosis Date    Arthritis     Diabetes mellitus     Fractures        History reviewed. No pertinent family history.    Social History     Socioeconomic History    Marital status:    Tobacco Use    Smoking status: Current Every Day Smoker     Packs/day: 1.00     Types: Cigarettes    Smokeless tobacco: Never Used   Substance and Sexual Activity    Alcohol use: No    Drug use: No       Past Surgical History:   Procedure Laterality Date    ENDOMETRIAL ABLATION      OPEN REDUCTION AND INTERNAL FIXATION (ORIF) OF FRACTURE OF METATARSAL BONE Left 1/19/2022    Procedure: ORIF, FRACTURE, METATARSAL BONE;  Surgeon: Colton Esteves DPM;  Location: [facility];  Service: Podiatry;  Laterality: Left;  nonunion metatarsal with bone graft       Review of Systems   Constitutional: Negative for chills, fatigue and fever.   HENT: Negative for hearing loss.    Eyes: Negative for  "photophobia and visual disturbance.   Respiratory: Negative for cough, chest tightness, shortness of breath and wheezing.    Cardiovascular: Negative for chest pain and palpitations.   Gastrointestinal: Negative for constipation, diarrhea, nausea and vomiting.   Endocrine: Negative for cold intolerance and heat intolerance.   Genitourinary: Negative for flank pain.   Musculoskeletal: Positive for gait problem. Negative for neck pain and neck stiffness.   Skin: Negative for wound.   Neurological: Negative for light-headedness and headaches.   Psychiatric/Behavioral: Negative for sleep disturbance.          Objective:   Ht 5' 9" (1.753 m)   Wt 128.4 kg (283 lb 1.1 oz)   BMI 41.80 kg/m²     LOWER EXTREMITY PHYSICAL EXAMINATION   DERMATOLOGY: Skin is supple, dry and intact.    ORTHOPEDIC: No pains. No edema is noted.      Assessment:     1. Postop check    2. 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 Pain of left foot    4. Type 2 diabetes mellitus without complication, without long-term current use of insulin    5. Tobacco abuse    6. 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     XRAYS are reviewed in detail with the patient. All questions and concerns regarding findings and its/their implications are outlined and discussed.    Continue CAM boot and Bone Stimulator until at least 3 months.      Did discuss in detail the harmful effects of nicotine/tobacco/cigarette/ marijuana smoking, especially in relation to the lower extremity. I do rec. consultation with primary " care provider for further discussed of smoking cessation methods. Smoking & Tobacco use cessation couseling was rendered at today's visit; intermediate, bewteen 3 and 10 minutes.    Strict DMII control.    RICE therapy.            No future appointments.

## 2022-04-26 ENCOUNTER — TELEPHONE (OUTPATIENT)
Dept: ORTHOPEDICS | Facility: CLINIC | Age: 42
End: 2022-04-26
Payer: MEDICAID

## 2022-04-26 DIAGNOSIS — M25.562 PAIN IN BOTH KNEES, UNSPECIFIED CHRONICITY: Primary | ICD-10-CM

## 2022-04-26 DIAGNOSIS — M25.561 PAIN IN BOTH KNEES, UNSPECIFIED CHRONICITY: Primary | ICD-10-CM

## 2022-04-26 NOTE — TELEPHONE ENCOUNTER
Patient wanted to be seen for bilateral knee pain. Offered next available with Dr. Bond in July. Pt declined and wanted to be sooner, scheduled with Dr. Fidencio Segovia on 5/10/22 with x-rays. Pt verbalized understanding.        ----- Message from Myla Martinez sent at 4/26/2022  2:50 PM CDT -----  Contact: Patient  Patient called to consult with nurse or staff regarding her referral to see a provider in orthopedics. She would like a call back to schedule an appointment and can be reached at [phone]. Thanks/MR

## (undated) DEVICE — APPLICATOR CHLORAPREP ORN 26ML

## (undated) DEVICE — KWIRE SMTH TRCR TIP 1.6X150MM
Type: IMPLANTABLE DEVICE | Site: FOOT | Status: NON-FUNCTIONAL
Removed: 2022-01-19

## (undated) DEVICE — BANDAGE MATRIX HK LOOP 4IN 5YD

## (undated) DEVICE — DRESSING SPONGE 16PLY 4X4 NS

## (undated) DEVICE — SYR IRRIGATION BULB STER 60ML

## (undated) DEVICE — BANDAGE DERMACEA STRETCH 4X1IN

## (undated) DEVICE — PAD ABD 8X10 STERILE

## (undated) DEVICE — BLADE SURG #15 CARBON STEEL

## (undated) DEVICE — BANDAGE MATRIX HK LOOP 6IN 5YD

## (undated) DEVICE — Device

## (undated) DEVICE — TOURNIQUET SB QC DP 34X4IN

## (undated) DEVICE — SUT VICRYL CTD 2-0 GI 27 SH

## (undated) DEVICE — UNDERGLOVES BIOGEL PI SIZE 8

## (undated) DEVICE — TIP SUCTION YANKAUER

## (undated) DEVICE — SEE MEDLINE ITEM 157027

## (undated) DEVICE — BNDG COFLEX FOAM LF2 ST 3X5YD

## (undated) DEVICE — BLADE SCALP OPHTL RND TIP

## (undated) DEVICE — SUT VICRYL 3-0 27 SH

## (undated) DEVICE — BLADE LONG 31.0MM X 9.0MM

## (undated) DEVICE — SUT ETHILON 3-0 PS2 18 BLK

## (undated) DEVICE — BURR CARBIDE OVAL STERILE 4MM

## (undated) DEVICE — MANIFOLD 4 PORT

## (undated) DEVICE — SUT 4-0 ETHILON 18 PS-2

## (undated) DEVICE — PAD CAST SPECIALIST STRL 4

## (undated) DEVICE — GLOVE SURG BIOGEL LATEX SZ 7.5

## (undated) DEVICE — BANDAGE ESMARK ELASTIC ST 4X9

## (undated) DEVICE — 1.6 DRILL BIT

## (undated) DEVICE — STAPLER SKIN PROXIMATE WIDE

## (undated) DEVICE — NDL SAFETY 25G X 1.5 ECLIPSE

## (undated) DEVICE — PAD UNDERPAD 30X30

## (undated) DEVICE — SEE MEDLINE ITEM 146298

## (undated) DEVICE — SYR 10CC LUER LOCK

## (undated) DEVICE — DECANTER 6 VIAL

## (undated) DEVICE — COVER LIGHT HANDLE 80/CA

## (undated) DEVICE — PADDING WYTEX UNDRCST 6INX4YD

## (undated) DEVICE — SPONGE DERMACEA GAUZE 4X4

## (undated) DEVICE — ELECTRODE REM PLYHSV RETURN 9

## (undated) DEVICE — SEE MEDLINE ITEM 157131